# Patient Record
Sex: FEMALE | Race: WHITE | NOT HISPANIC OR LATINO | Employment: UNEMPLOYED | URBAN - METROPOLITAN AREA
[De-identification: names, ages, dates, MRNs, and addresses within clinical notes are randomized per-mention and may not be internally consistent; named-entity substitution may affect disease eponyms.]

---

## 2023-09-15 ENCOUNTER — HOSPITAL ENCOUNTER (OUTPATIENT)
Dept: DATA CONVERSION | Facility: HOSPITAL | Age: 28
Discharge: HOME | End: 2023-09-15
Payer: COMMERCIAL

## 2023-09-15 DIAGNOSIS — O99.612 DISEASES OF THE DIGESTIVE SYSTEM COMPLICATING PREGNANCY, SECOND TRIMESTER (HHS-HCC): ICD-10-CM

## 2023-09-15 LAB
T3 SERPL-MCNC: 218 NG/DL (ref 94–170)
T4 FREE SERPL-MCNC: 0.9 NG/DL (ref 0.9–1.7)
TSH SERPL DL<=0.05 MIU/L-ACNC: 2.84 MIU/L (ref 0.27–4.2)

## 2023-09-17 PROBLEM — D72.9 WHITE BLOOD CELL DISORDER: Status: ACTIVE | Noted: 2023-09-17

## 2023-09-17 PROBLEM — N12 PYELONEPHRITIS: Status: ACTIVE | Noted: 2023-09-17

## 2023-09-17 PROBLEM — S09.90XA INJURY OF HEAD: Status: ACTIVE | Noted: 2023-09-17

## 2023-09-17 PROBLEM — K29.60 REFLUX GASTRITIS: Status: ACTIVE | Noted: 2023-09-17

## 2023-09-17 PROBLEM — R39.89 SENSATION OF PRESSURE IN BLADDER AREA: Status: ACTIVE | Noted: 2023-09-17

## 2023-09-17 PROBLEM — S83.90XA SPRAIN OF KNEE AND LEG: Status: ACTIVE | Noted: 2023-09-17

## 2023-09-17 PROBLEM — N60.12 FIBROCYSTIC DISEASE OF LEFT BREAST: Status: ACTIVE | Noted: 2023-09-17

## 2023-09-17 PROBLEM — M25.519 SHOULDER PAIN: Status: ACTIVE | Noted: 2023-09-17

## 2023-09-17 PROBLEM — R09.81 CONGESTION OF NASAL SINUS: Status: ACTIVE | Noted: 2023-09-17

## 2023-09-17 PROBLEM — N91.0 DELAYED MENSES: Status: ACTIVE | Noted: 2023-09-17

## 2023-09-17 PROBLEM — O36.0190 MATERNAL CARE FOR ANTI-D (RH) ANTIBODIES, UNSPECIFIED TRIMESTER, NOT APPLICABLE OR UNSPECIFIED (HHS-HCC): Status: ACTIVE | Noted: 2023-09-17

## 2023-09-17 PROBLEM — Z97.3 WEARS GLASSES: Status: ACTIVE | Noted: 2023-09-17

## 2023-09-17 PROBLEM — B37.31 YEAST VAGINITIS: Status: ACTIVE | Noted: 2023-09-17

## 2023-09-17 PROBLEM — M26.609 TMJ (TEMPOROMANDIBULAR JOINT DISORDER): Status: ACTIVE | Noted: 2023-09-17

## 2023-09-17 PROBLEM — R55 SYNCOPE: Status: ACTIVE | Noted: 2023-09-17

## 2023-09-17 PROBLEM — F32.9 MAJOR DEPRESSIVE DISORDER, SINGLE EPISODE, UNSPECIFIED: Status: ACTIVE | Noted: 2023-09-17

## 2023-09-17 PROBLEM — S89.90XA INJURY OF KNEE: Status: ACTIVE | Noted: 2023-09-17

## 2023-09-17 PROBLEM — N92.6 IRREGULAR MENSTRUAL CYCLE: Status: ACTIVE | Noted: 2023-09-17

## 2023-09-17 PROBLEM — R10.9 ABDOMINAL PAIN: Status: ACTIVE | Noted: 2023-09-17

## 2023-09-17 PROBLEM — M25.569 KNEE PAIN: Status: ACTIVE | Noted: 2023-09-17

## 2023-09-17 PROBLEM — N94.6 DYSMENORRHEA: Status: ACTIVE | Noted: 2023-09-17

## 2023-09-17 PROBLEM — R11.2 NAUSEA & VOMITING: Status: ACTIVE | Noted: 2023-09-17

## 2023-09-17 PROBLEM — G43.909 MIGRAINE HEADACHE: Status: ACTIVE | Noted: 2023-09-17

## 2023-09-17 PROBLEM — H81.319: Status: ACTIVE | Noted: 2023-09-17

## 2023-09-17 PROBLEM — N83.202 LEFT OVARIAN CYST: Status: ACTIVE | Noted: 2023-09-17

## 2023-09-17 PROBLEM — R09.1 PLEURISY: Status: ACTIVE | Noted: 2023-09-17

## 2023-09-17 PROBLEM — K59.09 CHRONIC CONSTIPATION: Status: ACTIVE | Noted: 2023-09-17

## 2023-09-17 PROBLEM — R42 VERTIGO: Status: ACTIVE | Noted: 2023-09-17

## 2023-09-17 PROBLEM — H52.7 REFRACTIVE ERROR: Status: ACTIVE | Noted: 2023-09-17

## 2023-09-17 PROBLEM — M54.9 BACKACHE: Status: ACTIVE | Noted: 2023-09-17

## 2023-09-17 PROBLEM — R10.9 FLANK PAIN: Status: ACTIVE | Noted: 2023-09-17

## 2023-09-17 PROBLEM — R10.2 FEMALE PELVIC PAIN: Status: ACTIVE | Noted: 2023-09-17

## 2023-09-17 PROBLEM — L20.9 ATOPIC DERMATITIS: Status: ACTIVE | Noted: 2023-09-17

## 2023-09-17 PROBLEM — H57.89 EYE DISCHARGE: Status: ACTIVE | Noted: 2023-09-17

## 2023-09-17 PROBLEM — F07.81 POSTCONCUSSION SYNDROME: Status: ACTIVE | Noted: 2023-09-17

## 2023-09-17 PROBLEM — R42 LIGHTHEADEDNESS: Status: ACTIVE | Noted: 2023-09-17

## 2023-09-17 PROBLEM — R10.9 ABDOMINAL DISCOMFORT: Status: ACTIVE | Noted: 2023-09-17

## 2023-09-17 PROBLEM — N94.0 OVULATION PAIN: Status: ACTIVE | Noted: 2023-09-17

## 2023-09-17 PROBLEM — E86.0 DEHYDRATION: Status: ACTIVE | Noted: 2023-09-17

## 2023-09-17 PROBLEM — K58.9 IRRITABLE BOWEL SYNDROME: Status: ACTIVE | Noted: 2023-09-17

## 2023-09-17 PROBLEM — R51.9 HEADACHE: Status: ACTIVE | Noted: 2023-09-17

## 2023-09-17 PROBLEM — F41.9 ANXIETY: Status: ACTIVE | Noted: 2023-09-17

## 2023-09-17 PROBLEM — R10.30 INGUINAL PAIN: Status: ACTIVE | Noted: 2023-09-17

## 2023-09-17 PROBLEM — K21.9 GASTROESOPHAGEAL REFLUX DISEASE: Status: ACTIVE | Noted: 2023-09-17

## 2023-09-17 PROBLEM — Z97.5 NEXPLANON IN PLACE: Status: ACTIVE | Noted: 2023-09-17

## 2023-09-17 PROBLEM — R30.0 DYSURIA: Status: ACTIVE | Noted: 2023-09-17

## 2023-09-17 PROBLEM — N94.10 DYSPAREUNIA IN FEMALE: Status: ACTIVE | Noted: 2023-09-17

## 2023-09-17 PROBLEM — N64.4 BREAST PAIN, LEFT: Status: ACTIVE | Noted: 2023-09-17

## 2023-09-17 RX ORDER — POLYETHYLENE GLYCOL 3350 17 G/17G
POWDER, FOR SOLUTION ORAL
COMMUNITY
End: 2023-10-16 | Stop reason: ALTCHOICE

## 2023-09-17 RX ORDER — SERTRALINE HYDROCHLORIDE 100 MG/1
100 TABLET, FILM COATED ORAL DAILY
COMMUNITY
Start: 2023-09-13 | End: 2023-10-16 | Stop reason: ALTCHOICE

## 2023-09-17 RX ORDER — LIDOCAINE 50 MG/G
PATCH TOPICAL DAILY
COMMUNITY
Start: 2022-09-27 | End: 2023-10-16 | Stop reason: ALTCHOICE

## 2023-09-17 RX ORDER — SERTRALINE HYDROCHLORIDE 50 MG/1
50 TABLET, FILM COATED ORAL DAILY
COMMUNITY
Start: 2021-03-24 | End: 2023-10-16 | Stop reason: ALTCHOICE

## 2023-09-17 RX ORDER — DIPHENHYDRAMINE HCL 25 MG
25 CAPSULE ORAL 3 TIMES DAILY
COMMUNITY
Start: 2020-09-03 | End: 2023-10-16 | Stop reason: ALTCHOICE

## 2023-09-17 RX ORDER — SERTRALINE HYDROCHLORIDE 100 MG/1
200 TABLET, FILM COATED ORAL DAILY
COMMUNITY
Start: 2023-03-22 | End: 2023-10-16 | Stop reason: ALTCHOICE

## 2023-09-17 RX ORDER — ONDANSETRON 4 MG/1
4 TABLET, FILM COATED ORAL EVERY 8 HOURS PRN
COMMUNITY
End: 2023-10-16 | Stop reason: ALTCHOICE

## 2023-09-17 RX ORDER — ONDANSETRON HYDROCHLORIDE 8 MG/1
8 TABLET, FILM COATED ORAL 3 TIMES DAILY
COMMUNITY
Start: 2013-07-26 | End: 2023-10-16 | Stop reason: ALTCHOICE

## 2023-09-17 RX ORDER — SYRING-NEEDL,DISP,INSUL,0.3 ML 29 G X1/2"
300 SYRINGE, EMPTY DISPOSABLE MISCELLANEOUS
COMMUNITY
Start: 2020-03-07 | End: 2023-10-16 | Stop reason: ALTCHOICE

## 2023-09-17 RX ORDER — ONDANSETRON 4 MG/1
4 TABLET, ORALLY DISINTEGRATING ORAL EVERY 6 HOURS PRN
COMMUNITY
Start: 2023-09-14 | End: 2023-10-16 | Stop reason: ALTCHOICE

## 2023-09-17 RX ORDER — NORGESTIMATE AND ETHINYL ESTRADIOL 0.25-0.035
KIT ORAL
COMMUNITY
Start: 2021-03-24 | End: 2023-10-16 | Stop reason: ALTCHOICE

## 2023-09-17 RX ORDER — MELOXICAM 7.5 MG/1
7.5 TABLET ORAL 2 TIMES DAILY
COMMUNITY
Start: 2022-09-27 | End: 2023-10-03 | Stop reason: ENTERED-IN-ERROR

## 2023-09-18 LAB — THYROPEROXIDASE AB SERPL-ACNC: NORMAL [IU]/ML

## 2023-09-19 LAB — THYROGLOB AB SERPL-ACNC: NORMAL [IU]/ML

## 2023-10-03 ENCOUNTER — TELEPHONE (OUTPATIENT)
Dept: OBSTETRICS AND GYNECOLOGY | Facility: CLINIC | Age: 28
End: 2023-10-03
Payer: COMMERCIAL

## 2023-10-03 DIAGNOSIS — O99.283 HYPERTHYROIDISM AFFECTING PREGNANCY IN THIRD TRIMESTER (HHS-HCC): Primary | ICD-10-CM

## 2023-10-03 DIAGNOSIS — E05.90 HYPERTHYROIDISM AFFECTING PREGNANCY IN THIRD TRIMESTER (HHS-HCC): Primary | ICD-10-CM

## 2023-10-03 NOTE — TELEPHONE ENCOUNTER
Patient notified that labs were ordered. She will get done and advised we would call her with results once we get them back.

## 2023-10-05 ENCOUNTER — LAB (OUTPATIENT)
Dept: LAB | Facility: LAB | Age: 28
End: 2023-10-05
Payer: COMMERCIAL

## 2023-10-05 DIAGNOSIS — O99.283 HYPERTHYROIDISM AFFECTING PREGNANCY IN THIRD TRIMESTER (HHS-HCC): ICD-10-CM

## 2023-10-05 DIAGNOSIS — E05.90 HYPERTHYROIDISM AFFECTING PREGNANCY IN THIRD TRIMESTER (HHS-HCC): ICD-10-CM

## 2023-10-05 LAB
T4 FREE SERPL-MCNC: 0.52 NG/DL (ref 0.61–1.12)
TSH SERPL-ACNC: 1.99 MIU/L (ref 0.44–3.98)

## 2023-10-05 PROCEDURE — 84480 ASSAY TRIIODOTHYRONINE (T3): CPT

## 2023-10-05 PROCEDURE — 36415 COLL VENOUS BLD VENIPUNCTURE: CPT

## 2023-10-06 LAB — T3 SERPL-MCNC: 242 NG/DL (ref 60–200)

## 2023-10-10 DIAGNOSIS — Z34.82 ENCOUNTER FOR SUPERVISION OF OTHER NORMAL PREGNANCY IN SECOND TRIMESTER (HHS-HCC): Primary | ICD-10-CM

## 2023-10-12 ENCOUNTER — OFFICE VISIT (OUTPATIENT)
Dept: PRIMARY CARE | Facility: CLINIC | Age: 28
End: 2023-10-12
Payer: COMMERCIAL

## 2023-10-12 VITALS
SYSTOLIC BLOOD PRESSURE: 124 MMHG | WEIGHT: 214 LBS | OXYGEN SATURATION: 99 % | HEIGHT: 55 IN | DIASTOLIC BLOOD PRESSURE: 77 MMHG | TEMPERATURE: 98.7 F | BODY MASS INDEX: 49.53 KG/M2 | HEART RATE: 94 BPM | RESPIRATION RATE: 18 BRPM

## 2023-10-12 DIAGNOSIS — F41.9 ANXIETY: ICD-10-CM

## 2023-10-12 DIAGNOSIS — R79.89 ABNORMAL THYROID BLOOD TEST: Primary | ICD-10-CM

## 2023-10-12 PROCEDURE — 99203 OFFICE O/P NEW LOW 30 MIN: CPT | Performed by: FAMILY MEDICINE

## 2023-10-12 RX ORDER — SERTRALINE HYDROCHLORIDE 100 MG/1
150 TABLET, FILM COATED ORAL DAILY
Qty: 45 TABLET | Refills: 1 | Status: SHIPPED | OUTPATIENT
Start: 2023-10-12 | End: 2024-01-16

## 2023-10-12 RX ORDER — PRENATAL VIT 49/IRON FUM/FOLIC 6.75-0.2MG
TABLET ORAL
COMMUNITY
End: 2024-03-19 | Stop reason: WASHOUT

## 2023-10-12 ASSESSMENT — ENCOUNTER SYMPTOMS
LOSS OF SENSATION IN FEET: 0
OCCASIONAL FEELINGS OF UNSTEADINESS: 0
DEPRESSION: 0

## 2023-10-12 ASSESSMENT — PATIENT HEALTH QUESTIONNAIRE - PHQ9
2. FEELING DOWN, DEPRESSED OR HOPELESS: NOT AT ALL
SUM OF ALL RESPONSES TO PHQ9 QUESTIONS 1 AND 2: 0
1. LITTLE INTEREST OR PLEASURE IN DOING THINGS: NOT AT ALL

## 2023-10-12 ASSESSMENT — PAIN SCALES - GENERAL: PAINLEVEL: 0-NO PAIN

## 2023-10-12 NOTE — PROGRESS NOTES
"Subjective   Patient ID: Veena Barone is a 27 y.o. female who presents for No chief complaint on file..    HPI     Review of Systems   All other systems reviewed and are negative.      Objective   /77 (BP Location: Left arm, Patient Position: Sitting, BP Cuff Size: Adult)   Pulse 94   Temp 37.1 °C (98.7 °F) (Temporal)   Resp 18   Ht 0.787 m (2' 7\")   Wt 97.1 kg (214 lb)   LMP 05/15/2023   SpO2 99%   .56 kg/m²     Physical Exam    Assessment/Plan          "

## 2023-10-16 ENCOUNTER — ROUTINE PRENATAL (OUTPATIENT)
Dept: OBSTETRICS AND GYNECOLOGY | Facility: CLINIC | Age: 28
End: 2023-10-16
Payer: COMMERCIAL

## 2023-10-16 VITALS
DIASTOLIC BLOOD PRESSURE: 72 MMHG | WEIGHT: 215.4 LBS | SYSTOLIC BLOOD PRESSURE: 118 MMHG | BODY MASS INDEX: 157.59 KG/M2

## 2023-10-16 DIAGNOSIS — Z3A.21 21 WEEKS GESTATION OF PREGNANCY (HHS-HCC): ICD-10-CM

## 2023-10-16 DIAGNOSIS — Z34.82 ENCOUNTER FOR SUPERVISION OF OTHER NORMAL PREGNANCY IN SECOND TRIMESTER (HHS-HCC): Primary | ICD-10-CM

## 2023-10-16 LAB
POC APPEARANCE, URINE: CLEAR
POC BILIRUBIN, URINE: NEGATIVE
POC BLOOD, URINE: ABNORMAL
POC COLOR, URINE: YELLOW
POC GLUCOSE, URINE: NEGATIVE MG/DL
POC KETONES, URINE: NEGATIVE MG/DL
POC LEUKOCYTES, URINE: ABNORMAL
POC NITRITE,URINE: NEGATIVE
POC PH, URINE: 6.5 PH
POC PROTEIN, URINE: NEGATIVE MG/DL
POC SPECIFIC GRAVITY, URINE: 1.02
POC UROBILINOGEN, URINE: 0.2 EU/DL

## 2023-10-16 PROCEDURE — 0501F PRENATAL FLOW SHEET: CPT | Performed by: OBSTETRICS & GYNECOLOGY

## 2023-10-16 PROCEDURE — 99213 OFFICE O/P EST LOW 20 MIN: CPT | Performed by: OBSTETRICS & GYNECOLOGY

## 2023-10-16 PROCEDURE — 81003 URINALYSIS AUTO W/O SCOPE: CPT | Mod: QW | Performed by: OBSTETRICS & GYNECOLOGY

## 2023-10-16 ASSESSMENT — PATIENT HEALTH QUESTIONNAIRE - PHQ9
SUM OF ALL RESPONSES TO PHQ9 QUESTIONS 1 & 2: 0
1. LITTLE INTEREST OR PLEASURE IN DOING THINGS: NOT AT ALL
2. FEELING DOWN, DEPRESSED OR HOPELESS: NOT AT ALL

## 2023-10-16 NOTE — PROGRESS NOTES
Subjective   Patient ID [id number]   Veena Barone is a 27 y.o.  at 21w4d with a working estimated date of delivery of 2024, by Ultrasound who presents for a routine prenatal visit. She denies vaginal bleeding, leakage of fluid, decreased fetal movements, or contractions.    Her pregnancy is complicated by:  Rh neg  Depression, on meds  Abnormal thyroid labs    Objective   Physical Exam  Weight: 97.7 kg (215 lb 6.4 oz)  Expected Total Weight Gain: 5 kg (11 lb)-9 kg (19 lb)   Pregravid BMI: 160.39  BP: 118/72         Prenatal Labs  Urine dip:  Lab Results   Component Value Date    KETONESU NEGATIVE 2023         Imaging  Normal anatomy scan with poor heart views, will sched for 30 wks    Assessment/Plan   Diagnoses and all orders for this visit:  Encounter for supervision of other normal pregnancy in second trimester  -     POCT UA Automated manually resulted  21 weeks gestation of pregnancy      Continue prenatal vitamin.  Poor heart views on ultrasound, will sched follow up at 30 wks  Endocrine referral.  GTT next visit  Orders placed for follow up ultrasound and GTT  Follow up in 4 weeks for a routine prenatal visit.    MD Alexandria Mann MD

## 2023-10-17 ENCOUNTER — TELEPHONE (OUTPATIENT)
Dept: OBSTETRICS AND GYNECOLOGY | Facility: CLINIC | Age: 28
End: 2023-10-17
Payer: COMMERCIAL

## 2023-10-17 NOTE — TELEPHONE ENCOUNTER
Patient called stating that she woke up today with yeast infection. C/o swelling, itching and white discharge. Advised I would call in Diflucan 150mg take one tab PO as directed #1 with zero refills. Patient notified Rx called in.

## 2023-10-27 ENCOUNTER — OFFICE VISIT (OUTPATIENT)
Dept: ENDOCRINOLOGY | Facility: CLINIC | Age: 28
End: 2023-10-27
Payer: COMMERCIAL

## 2023-10-27 VITALS
DIASTOLIC BLOOD PRESSURE: 72 MMHG | BODY MASS INDEX: 158.03 KG/M2 | HEART RATE: 97 BPM | WEIGHT: 216 LBS | SYSTOLIC BLOOD PRESSURE: 133 MMHG

## 2023-10-27 DIAGNOSIS — Z3A.23 23 WEEKS GESTATION OF PREGNANCY (HHS-HCC): ICD-10-CM

## 2023-10-27 DIAGNOSIS — R94.6 ABNORMAL THYROID FUNCTION TEST: Primary | ICD-10-CM

## 2023-10-27 PROCEDURE — 99204 OFFICE O/P NEW MOD 45 MIN: CPT | Performed by: INTERNAL MEDICINE

## 2023-10-27 PROCEDURE — 1036F TOBACCO NON-USER: CPT | Performed by: INTERNAL MEDICINE

## 2023-10-27 NOTE — PROGRESS NOTES
History Of Present Illness  Veena Barone is a 27 y.o. female with abnormal thyroid function tests  G2, P1  Weeks' gestation 23  QIAN: 2/22/24    Weight loss instead of gain, starting to gain  Alternating hot and cold intolerance  Infrequent palpitations    Fetal growth normal per patient.    No history of thyroid dysfunction  No history of goiter  No history of neck surgery or radiation    Past Medical History  She has a past medical history of Depression, Disorder of the skin and subcutaneous tissue, unspecified (05/14/2014), Encounter for contraceptive management, unspecified (04/17/2015), Encounter for immunization (02/13/2014), GERD (gastroesophageal reflux disease), ovarian cyst, Left upper quadrant abdominal tenderness (06/25/2013), Migraine, Obesity in pregnancy, Other chest pain, Other urogenital candidiasis (09/15/2014), Personal history of diseases of the skin and subcutaneous tissue (06/01/2015), Personal history of other diseases of the digestive system (05/14/2014), Personal history of other diseases of the female genital tract (03/26/2014), Personal history of other diseases of the musculoskeletal system and connective tissue (03/24/2015), Personal history of other diseases of the nervous system and sense organs (08/30/2015), Personal history of other diseases of the respiratory system (09/04/2013), Personal history of other specified conditions (11/18/2015), and Personal history of other specified conditions (01/14/2014).    Surgical History  She has no past surgical history on file.     Social History  She reports that she has never smoked. She has never used smokeless tobacco. She reports that she does not drink alcohol and does not use drugs.    Family History  Family History   Problem Relation Name Age of Onset    Hypertension Mother      Kidney nephrosis Mother      No Known Problems Father      No Known Problems Sister      Diabetes Mother's Sister      Liver cancer Maternal Grandmother       Thyroid cancer Maternal Grandmother      Heart attack Maternal Grandfather      Diabetes Maternal Grandfather      Lung cancer Paternal Grandmother      Heart attack Paternal Grandfather          cause of death    Heart disease Paternal Grandfather      No Known Problems Daughter      Endometriosis Other         Allergies  Patient has no known allergies.    Review of Systems   Constitutional:  Negative for unexpected weight change.   Eyes:  Negative for visual disturbance.   Respiratory:  Negative for shortness of breath.    Cardiovascular:  Positive for palpitations. Negative for chest pain.   Gastrointestinal:  Negative for constipation, diarrhea, nausea and vomiting.   Endocrine:        As above   Musculoskeletal:  Negative for arthralgias.   Skin:  Negative for rash.         Last Recorded Vitals  Blood pressure 133/72, pulse 97, weight 98 kg (216 lb), last menstrual period 05/15/2023.    Physical Exam  Constitutional:       General: She is not in acute distress.  HENT:      Head: Normocephalic.   Eyes:      Extraocular Movements: Extraocular movements intact.   Neck:      Thyroid: No thyromegaly.   Cardiovascular:      Pulses:           Radial pulses are 2+ on the right side and 2+ on the left side.   Lymphadenopathy:      Cervical: No cervical adenopathy.   Neurological:      Mental Status: She is alert.      Motor: No tremor.   Psychiatric:         Mood and Affect: Affect normal.          Relevant Results  Lab Results   Component Value Date    TSH 1.99 10/05/2023    FREET4 0.52 (L) 10/05/2023    THYROIDPAB  09/15/2023     <28  Unit: IU/mL    Negative: <=60 U/mL  Positive: >60 U/mL  Test performed at:  [facility] [address] [facility] [address]        Latest Reference Range & Units 09/15/23 13:52   Thyroxine, Free 0.9 - 1.7 NG/DL 0.9   Triiodothyronine 94 - 170 NG/ (H)   Thyroid Stimulating Hormone 0.27 - 4.20 MIU/L 2.84   (H): Data is abnormally high    IMPRESSION  ABNORMAL THYROID FUNCTION  TEST  PREGNANCY  Elevation of T3 of undetermined significance  Normal TSH suggests she is euthyroid  Discussed possible interference with thyroid assays by Biotin supplementation  No goiter      RECOMMENDATIONS  HOLD prenatal vitamin for 2 days then redraw thyroid labs: TSH, T4, T3  Results on [employer]  Call/message if you have not received results in 3 days.    Follow up to be determined.

## 2023-10-27 NOTE — PATIENT INSTRUCTIONS
RECOMMENDATIONS  HOLD prenatal vitamin for 2 days then redraw thyroid labs: TSH, T4, T3  Results on [employer]  Call/message if you have not received results in 3 days.    Follow up to be determined.

## 2023-10-27 NOTE — LETTER
October 29, 2023     Juan Cesar DO  [address]  [address]  [facility] [address]    Patient: Veena Barone   YOB: 1995   Date of Visit: 10/27/2023       Dear Dr. Juan Cesar DO:    Thank you for referring Veena Barone to me for evaluation. Below are my notes for this consultation.  If you have questions, please do not hesitate to call me. I look forward to following your patient along with you.       Sincerely,     Jarod Parker MD      CC: Alexandria Rao MD  ______________________________________________________________________________________    History Of Present Illness  Veena Barone is a 27 y.o. female with abnormal thyroid function tests  G2, P1  Weeks' gestation 23  QIAN: 2/22/24    Weight loss instead of gain, starting to gain  Alternating hot and cold intolerance  Infrequent palpitations    Fetal growth normal per patient.    No history of thyroid dysfunction  No history of goiter  No history of neck surgery or radiation    Past Medical History  She has a past medical history of Depression, Disorder of the skin and subcutaneous tissue, unspecified (05/14/2014), Encounter for contraceptive management, unspecified (04/17/2015), Encounter for immunization (02/13/2014), GERD (gastroesophageal reflux disease), ovarian cyst, Left upper quadrant abdominal tenderness (06/25/2013), Migraine, Obesity in pregnancy, Other chest pain, Other urogenital candidiasis (09/15/2014), Personal history of diseases of the skin and subcutaneous tissue (06/01/2015), Personal history of other diseases of the digestive system (05/14/2014), Personal history of other diseases of the female genital tract (03/26/2014), Personal history of other diseases of the musculoskeletal system and connective tissue (03/24/2015), Personal history of other diseases of the nervous system and sense organs (08/30/2015), Personal history of other diseases of the respiratory system (09/04/2013),  Personal history of other specified conditions (11/18/2015), and Personal history of other specified conditions (01/14/2014).    Surgical History  She has no past surgical history on file.     Social History  She reports that she has never smoked. She has never used smokeless tobacco. She reports that she does not drink alcohol and does not use drugs.    Family History  Family History   Problem Relation Name Age of Onset   • Hypertension Mother     • Kidney nephrosis Mother     • No Known Problems Father     • No Known Problems Sister     • Diabetes Mother's Sister     • Liver cancer Maternal Grandmother     • Thyroid cancer Maternal Grandmother     • Heart attack Maternal Grandfather     • Diabetes Maternal Grandfather     • Lung cancer Paternal Grandmother     • Heart attack Paternal Grandfather          cause of death   • Heart disease Paternal Grandfather     • No Known Problems Daughter     • Endometriosis Other         Allergies  Patient has no known allergies.    Review of Systems   Constitutional:  Negative for unexpected weight change.   Eyes:  Negative for visual disturbance.   Respiratory:  Negative for shortness of breath.    Cardiovascular:  Positive for palpitations. Negative for chest pain.   Gastrointestinal:  Negative for constipation, diarrhea, nausea and vomiting.   Endocrine:        As above   Musculoskeletal:  Negative for arthralgias.   Skin:  Negative for rash.         Last Recorded Vitals  Blood pressure 133/72, pulse 97, weight 98 kg (216 lb), last menstrual period 05/15/2023.    Physical Exam  Constitutional:       General: She is not in acute distress.  HENT:      Head: Normocephalic.   Eyes:      Extraocular Movements: Extraocular movements intact.   Neck:      Thyroid: No thyromegaly.   Cardiovascular:      Pulses:           Radial pulses are 2+ on the right side and 2+ on the left side.   Lymphadenopathy:      Cervical: No cervical adenopathy.   Neurological:      Mental Status: She is  alert.      Motor: No tremor.   Psychiatric:         Mood and Affect: Affect normal.          Relevant Results  Lab Results   Component Value Date    TSH 1.99 10/05/2023    FREET4 0.52 (L) 10/05/2023    THYROIDPAB  09/15/2023     <28  Unit: IU/mL    Negative: <=60 U/mL  Positive: >60 U/mL  Test performed at:  [facility] [address] [facility] [address]        Latest Reference Range & Units 09/15/23 13:52   Thyroxine, Free 0.9 - 1.7 NG/DL 0.9   Triiodothyronine 94 - 170 NG/ (H)   Thyroid Stimulating Hormone 0.27 - 4.20 MIU/L 2.84   (H): Data is abnormally high    IMPRESSION  ABNORMAL THYROID FUNCTION TEST  PREGNANCY  Elevation of T3 of undetermined significance  Normal TSH suggests she is euthyroid  Discussed possible interference with thyroid assays by Biotin supplementation  No goiter      RECOMMENDATIONS  HOLD prenatal vitamin for 2 days then redraw thyroid labs: TSH, T4, T3  Results on [employer]  Call/message if you have not received results in 3 days.    Follow up to be determined.

## 2023-10-29 ASSESSMENT — ENCOUNTER SYMPTOMS
VOMITING: 0
ARTHRALGIAS: 0
ENDOCRINE COMMENTS: AS ABOVE
NAUSEA: 0
DIARRHEA: 0
PALPITATIONS: 1
UNEXPECTED WEIGHT CHANGE: 0
CONSTIPATION: 0
SHORTNESS OF BREATH: 0

## 2023-11-03 ENCOUNTER — LAB (OUTPATIENT)
Dept: LAB | Facility: LAB | Age: 28
End: 2023-11-03
Payer: COMMERCIAL

## 2023-11-03 DIAGNOSIS — R94.6 ABNORMAL THYROID FUNCTION TEST: ICD-10-CM

## 2023-11-03 LAB
T3 SERPL-MCNC: 247 NG/DL (ref 60–200)
T4 SERPL-MCNC: 10.8 UG/DL (ref 4.5–11.1)
TSH SERPL-ACNC: 2.15 MIU/L (ref 0.44–3.98)

## 2023-11-03 PROCEDURE — 36415 COLL VENOUS BLD VENIPUNCTURE: CPT

## 2023-11-03 PROCEDURE — 84436 ASSAY OF TOTAL THYROXINE: CPT

## 2023-11-03 PROCEDURE — 84480 ASSAY TRIIODOTHYRONINE (T3): CPT

## 2023-11-16 ENCOUNTER — ROUTINE PRENATAL (OUTPATIENT)
Dept: OBSTETRICS AND GYNECOLOGY | Facility: CLINIC | Age: 28
End: 2023-11-16
Payer: COMMERCIAL

## 2023-11-16 ENCOUNTER — LAB (OUTPATIENT)
Dept: LAB | Facility: LAB | Age: 28
End: 2023-11-16
Payer: COMMERCIAL

## 2023-11-16 VITALS — SYSTOLIC BLOOD PRESSURE: 122 MMHG | WEIGHT: 221 LBS | DIASTOLIC BLOOD PRESSURE: 70 MMHG | BODY MASS INDEX: 161.69 KG/M2

## 2023-11-16 DIAGNOSIS — Z3A.25 25 WEEKS GESTATION OF PREGNANCY (HHS-HCC): ICD-10-CM

## 2023-11-16 DIAGNOSIS — Z34.82 ENCOUNTER FOR SUPERVISION OF OTHER NORMAL PREGNANCY IN SECOND TRIMESTER (HHS-HCC): ICD-10-CM

## 2023-11-16 DIAGNOSIS — Z34.82 ENCOUNTER FOR SUPERVISION OF OTHER NORMAL PREGNANCY IN SECOND TRIMESTER (HHS-HCC): Primary | ICD-10-CM

## 2023-11-16 LAB
ABO GROUP (TYPE) IN BLOOD: NORMAL
BASOPHILS # BLD AUTO: 0.03 X10*3/UL (ref 0–0.1)
BASOPHILS NFR BLD AUTO: 0.3 %
EOSINOPHIL # BLD AUTO: 0.19 X10*3/UL (ref 0–0.7)
EOSINOPHIL NFR BLD AUTO: 1.9 %
ERYTHROCYTE [DISTWIDTH] IN BLOOD BY AUTOMATED COUNT: 13.7 % (ref 11.5–14.5)
GLUCOSE 1H P GLC SERPL-MCNC: 117 MG/DL (ref 65–139)
HCT VFR BLD AUTO: 35.2 % (ref 36–46)
HGB BLD-MCNC: 11.8 G/DL (ref 12–16)
IMM GRANULOCYTES # BLD AUTO: 0.04 X10*3/UL (ref 0–0.7)
IMM GRANULOCYTES NFR BLD AUTO: 0.4 % (ref 0–0.9)
LYMPHOCYTES # BLD AUTO: 2.08 X10*3/UL (ref 1.2–4.8)
LYMPHOCYTES NFR BLD AUTO: 20.7 %
MCH RBC QN AUTO: 30.9 PG (ref 26–34)
MCHC RBC AUTO-ENTMCNC: 33.5 G/DL (ref 32–36)
MCV RBC AUTO: 92 FL (ref 80–100)
MONOCYTES # BLD AUTO: 0.67 X10*3/UL (ref 0.1–1)
MONOCYTES NFR BLD AUTO: 6.7 %
NEUTROPHILS # BLD AUTO: 7.06 X10*3/UL (ref 1.2–7.7)
NEUTROPHILS NFR BLD AUTO: 70 %
NRBC BLD-RTO: 0 /100 WBCS (ref 0–0)
PLATELET # BLD AUTO: 347 X10*3/UL (ref 150–450)
POC APPEARANCE, URINE: CLEAR
POC BILIRUBIN, URINE: NEGATIVE
POC BLOOD, URINE: NEGATIVE
POC COLOR, URINE: YELLOW
POC GLUCOSE, URINE: NEGATIVE MG/DL
POC KETONES, URINE: ABNORMAL MG/DL
POC LEUKOCYTES, URINE: ABNORMAL
POC NITRITE,URINE: NEGATIVE
POC PH, URINE: 7 PH
POC PROTEIN, URINE: ABNORMAL MG/DL
POC SPECIFIC GRAVITY, URINE: 1.02
POC UROBILINOGEN, URINE: 0.2 EU/DL
RBC # BLD AUTO: 3.82 X10*6/UL (ref 4–5.2)
RH FACTOR (ANTIGEN D): NORMAL
WBC # BLD AUTO: 10.1 X10*3/UL (ref 4.4–11.3)

## 2023-11-16 PROCEDURE — 81003 URINALYSIS AUTO W/O SCOPE: CPT | Performed by: OBSTETRICS & GYNECOLOGY

## 2023-11-16 PROCEDURE — 86901 BLOOD TYPING SEROLOGIC RH(D): CPT

## 2023-11-16 PROCEDURE — 85025 COMPLETE CBC W/AUTO DIFF WBC: CPT

## 2023-11-16 PROCEDURE — 0501F PRENATAL FLOW SHEET: CPT | Performed by: OBSTETRICS & GYNECOLOGY

## 2023-11-16 PROCEDURE — 86900 BLOOD TYPING SEROLOGIC ABO: CPT

## 2023-11-16 PROCEDURE — 36415 COLL VENOUS BLD VENIPUNCTURE: CPT

## 2023-11-16 PROCEDURE — 82947 ASSAY GLUCOSE BLOOD QUANT: CPT

## 2023-11-16 ASSESSMENT — LIFESTYLE VARIABLES
HOW MANY STANDARD DRINKS CONTAINING ALCOHOL DO YOU HAVE ON A TYPICAL DAY: PATIENT DOES NOT DRINK
HOW OFTEN DO YOU HAVE A DRINK CONTAINING ALCOHOL: NEVER

## 2023-11-16 NOTE — PROGRESS NOTES
Subjective   Patient ID [id number]   Veena Barone is a 27 y.o.  at 25w6d with a working estimated date of delivery of 2024, by Ultrasound who presents for a routine prenatal visit. She denies vaginal bleeding, leakage of fluid, decreased fetal movements, or contractions.      Objective   Physical Exam     Pregravid BMI: 160.39                       Prenatal Labs  Urine dip:  Results for orders placed or performed in visit on 23   T4   Result Value Ref Range    Thyroxine 10.8 4.5 - 11.1 ug/dL   T3   Result Value Ref Range    Triiodothyronine 247 (H) 60 - 200 ng/dL   TSH   Result Value Ref Range    Thyroid Stimulating Hormone 2.15 0.44 - 3.98 mIU/L         Imaging  Scheduled at 33 weeks    Assessment/Plan   Diagnoses and all orders for this visit:  Encounter for supervision of other normal pregnancy in second trimester  25 weeks gestation of pregnancy  -     POCT UA Automated manually resulted    Continue prenatal vitamin.  Labs reviewed.  Rhogam -RECEIVED AT 11 WEEKS-WILL CHECK T&S  GTT .    Follow up in 2 weeks for a routine prenatal visit.

## 2023-11-30 ENCOUNTER — TELEPHONE (OUTPATIENT)
Dept: OBSTETRICS AND GYNECOLOGY | Facility: CLINIC | Age: 28
End: 2023-11-30
Payer: COMMERCIAL

## 2023-11-30 NOTE — TELEPHONE ENCOUNTER
28 weeks pregnant.  Pt has 1 hour done 11/16 and she is Rh negative.  Pt next appt she will be 30 weeks, does she need to have rhogam sooner or can she get done at her next appt

## 2023-12-04 ENCOUNTER — OFFICE VISIT (OUTPATIENT)
Dept: OBSTETRICS AND GYNECOLOGY | Facility: CLINIC | Age: 28
End: 2023-12-04
Payer: COMMERCIAL

## 2023-12-04 VITALS
SYSTOLIC BLOOD PRESSURE: 134 MMHG | DIASTOLIC BLOOD PRESSURE: 72 MMHG | BODY MASS INDEX: 162.56 KG/M2 | WEIGHT: 222.2 LBS

## 2023-12-04 DIAGNOSIS — Z3A.28 28 WEEKS GESTATION OF PREGNANCY (HHS-HCC): ICD-10-CM

## 2023-12-04 DIAGNOSIS — Z34.82 ENCOUNTER FOR SUPERVISION OF OTHER NORMAL PREGNANCY IN SECOND TRIMESTER (HHS-HCC): Primary | ICD-10-CM

## 2023-12-04 PROCEDURE — 99212 OFFICE O/P EST SF 10 MIN: CPT | Performed by: OBSTETRICS & GYNECOLOGY

## 2023-12-04 PROCEDURE — 1036F TOBACCO NON-USER: CPT | Performed by: OBSTETRICS & GYNECOLOGY

## 2023-12-04 PROCEDURE — 2500000004 HC RX 250 GENERAL PHARMACY W/ HCPCS (ALT 636 FOR OP/ED): Performed by: OBSTETRICS & GYNECOLOGY

## 2023-12-04 RX ADMIN — HUMAN RHO(D) IMMUNE GLOBULIN 300 MCG: 300 INJECTION, SOLUTION INTRAMUSCULAR at 15:00

## 2023-12-08 NOTE — PROGRESS NOTES
Subjective   Patient ID [id number]   Veena Barone is a 28 y.o.  at 29w1d with a working estimated date of delivery of 2024, by Ultrasound who presents for a routine prenatal visit. She denies vaginal bleeding, leakage of fluid, decreased fetal movements, or contractions.    Objective   Physical Exam:   Weight: 101 kg (223 lb)    Pregravid BMI: 160.39      BP: 130/72                  Prenatal Labs  Urine Dip  Results for orders placed or performed in visit on 23   POCT UA Automated manually resulted   Result Value Ref Range    POC Color, Urine Yellow Straw, Yellow, Light-Yellow    POC Appearance, Urine Clear Clear    POC Glucose, Urine NEGATIVE NEGATIVE mg/dl    POC Bilirubin, Urine NEGATIVE NEGATIVE    POC Ketones, Urine 40 (2+) (A) NEGATIVE mg/dl    POC Specific Gravity, Urine >=1.030 1.005 - 1.035    POC Blood, Urine NEGATIVE NEGATIVE    POC PH, Urine 7.0 No Reference Range Established PH    POC Protein, Urine TRACE (A) NEGATIVE, 30 (1+) mg/dl    POC Urobilinogen, Urine 0.2 0.2, 1.0 EU/DL    Poc Nitrite, Urine NEGATIVE NEGATIVE    POC Leukocytes, Urine SMALL (1+) (A) NEGATIVE         Assessment/Plan   Diagnoses and all orders for this visit:  Encounter for supervision of other normal pregnancy in second trimester  29 weeks gestation of pregnancy  -     POCT UA Automated manually resulted    Continue prenatal vitamin.    Expected mode of delivery   Follow up in 1 week for a routine prenatal visit.

## 2023-12-13 ENCOUNTER — ROUTINE PRENATAL (OUTPATIENT)
Dept: OBSTETRICS AND GYNECOLOGY | Facility: CLINIC | Age: 28
End: 2023-12-13
Payer: COMMERCIAL

## 2023-12-13 ENCOUNTER — APPOINTMENT (OUTPATIENT)
Dept: OBSTETRICS AND GYNECOLOGY | Facility: CLINIC | Age: 28
End: 2023-12-13
Payer: COMMERCIAL

## 2023-12-13 VITALS — DIASTOLIC BLOOD PRESSURE: 72 MMHG | WEIGHT: 223 LBS | SYSTOLIC BLOOD PRESSURE: 130 MMHG | BODY MASS INDEX: 163.15 KG/M2

## 2023-12-13 DIAGNOSIS — Z3A.29 29 WEEKS GESTATION OF PREGNANCY (HHS-HCC): ICD-10-CM

## 2023-12-13 DIAGNOSIS — Z34.82 ENCOUNTER FOR SUPERVISION OF OTHER NORMAL PREGNANCY IN SECOND TRIMESTER (HHS-HCC): Primary | ICD-10-CM

## 2023-12-13 LAB
POC APPEARANCE, URINE: CLEAR
POC BILIRUBIN, URINE: NEGATIVE
POC BLOOD, URINE: NEGATIVE
POC COLOR, URINE: YELLOW
POC GLUCOSE, URINE: NEGATIVE MG/DL
POC KETONES, URINE: ABNORMAL MG/DL
POC LEUKOCYTES, URINE: ABNORMAL
POC NITRITE,URINE: NEGATIVE
POC PH, URINE: 7 PH
POC PROTEIN, URINE: ABNORMAL MG/DL
POC SPECIFIC GRAVITY, URINE: >=1.03
POC UROBILINOGEN, URINE: 0.2 EU/DL

## 2023-12-13 PROCEDURE — 81003 URINALYSIS AUTO W/O SCOPE: CPT | Performed by: OBSTETRICS & GYNECOLOGY

## 2023-12-13 PROCEDURE — 0501F PRENATAL FLOW SHEET: CPT | Performed by: OBSTETRICS & GYNECOLOGY

## 2023-12-13 ASSESSMENT — LIFESTYLE VARIABLES
HOW MANY STANDARD DRINKS CONTAINING ALCOHOL DO YOU HAVE ON A TYPICAL DAY: PATIENT DOES NOT DRINK
HOW OFTEN DO YOU HAVE SIX OR MORE DRINKS ON ONE OCCASION: NEVER
HOW OFTEN DO YOU HAVE A DRINK CONTAINING ALCOHOL: NEVER
SKIP TO QUESTIONS 9-10: 1
AUDIT-C TOTAL SCORE: 0

## 2023-12-13 ASSESSMENT — PATIENT HEALTH QUESTIONNAIRE - PHQ9
2. FEELING DOWN, DEPRESSED OR HOPELESS: NOT AT ALL
SUM OF ALL RESPONSES TO PHQ9 QUESTIONS 1 & 2: 0
1. LITTLE INTEREST OR PLEASURE IN DOING THINGS: NOT AT ALL

## 2023-12-13 ASSESSMENT — ENCOUNTER SYMPTOMS
DEPRESSION: 0
OCCASIONAL FEELINGS OF UNSTEADINESS: 0
LOSS OF SENSATION IN FEET: 0

## 2023-12-13 ASSESSMENT — PAIN SCALES - GENERAL: PAINLEVEL_OUTOF10: 0 - NO PAIN

## 2023-12-13 ASSESSMENT — PAIN - FUNCTIONAL ASSESSMENT: PAIN_FUNCTIONAL_ASSESSMENT: 0-10

## 2023-12-27 ENCOUNTER — APPOINTMENT (OUTPATIENT)
Dept: OBSTETRICS AND GYNECOLOGY | Facility: CLINIC | Age: 28
End: 2023-12-27
Payer: COMMERCIAL

## 2024-01-02 NOTE — PROGRESS NOTES
Subjective   Patient ID [id number]   Veena Barone is a 28 y.o.  at 32w5d with a working estimated date of delivery of 2024, by Ultrasound who presents for a routine prenatal visit. She denies vaginal bleeding, leakage of fluid, decreased fetal movements, or contractions.    Her pregnancy is complicated by:  Abnormal thyroid labs, seeing endocrine  Poor heart views -   Last pregnancy slight shoulder dystocia 7#,9oz  Wants PP BPS    Objective   Physical Exam:   Weight: 101 kg (223 lb)    Pregravid BMI: 160.39      BP: 128/74  Fetal Heart Rate: 145 Fundal Height (cm): 33 cm Presentation: Vertex           Prenatal Labs  Urine Dip  Results for orders placed or performed in visit on 24   POCT UA Automated manually resulted   Result Value Ref Range    POC Color, Urine Yellow Straw, Yellow, Light-Yellow    POC Appearance, Urine Clear Clear    POC Glucose, Urine NEGATIVE NEGATIVE mg/dl    POC Bilirubin, Urine NEGATIVE NEGATIVE    POC Ketones, Urine NEGATIVE NEGATIVE mg/dl    POC Specific Gravity, Urine >=1.030 1.005 - 1.035    POC Blood, Urine TRACE-Intact (A) NEGATIVE    POC PH, Urine 7.0 No Reference Range Established PH    POC Protein, Urine TRACE (A) NEGATIVE, 30 (1+) mg/dl    POC Urobilinogen, Urine 0.2 0.2, 1.0 EU/DL    Poc Nitrite, Urine NEGATIVE NEGATIVE    POC Leukocytes, Urine MODERATE (2+) (A) NEGATIVE               Assessment/Plan   Diagnoses and all orders for this visit:  Prenatal care, subsequent pregnancy, third trimester  32 weeks gestation of pregnancy  -     POCT UA Automated manually resulted    Continue prenatal vitamin.    Follow up in 1 week for a routine prenatal visit.

## 2024-01-03 ENCOUNTER — ROUTINE PRENATAL (OUTPATIENT)
Dept: OBSTETRICS AND GYNECOLOGY | Facility: CLINIC | Age: 29
End: 2024-01-03
Payer: COMMERCIAL

## 2024-01-03 VITALS — WEIGHT: 223 LBS | DIASTOLIC BLOOD PRESSURE: 74 MMHG | SYSTOLIC BLOOD PRESSURE: 128 MMHG | BODY MASS INDEX: 163.15 KG/M2

## 2024-01-03 DIAGNOSIS — Z34.83 PRENATAL CARE, SUBSEQUENT PREGNANCY, THIRD TRIMESTER (HHS-HCC): Primary | ICD-10-CM

## 2024-01-03 DIAGNOSIS — Z3A.32 32 WEEKS GESTATION OF PREGNANCY (HHS-HCC): ICD-10-CM

## 2024-01-03 LAB
POC APPEARANCE, URINE: CLEAR
POC BILIRUBIN, URINE: NEGATIVE
POC BLOOD, URINE: ABNORMAL
POC COLOR, URINE: YELLOW
POC GLUCOSE, URINE: NEGATIVE MG/DL
POC KETONES, URINE: NEGATIVE MG/DL
POC LEUKOCYTES, URINE: ABNORMAL
POC NITRITE,URINE: NEGATIVE
POC PH, URINE: 7 PH
POC PROTEIN, URINE: ABNORMAL MG/DL
POC SPECIFIC GRAVITY, URINE: >=1.03
POC UROBILINOGEN, URINE: 0.2 EU/DL

## 2024-01-03 PROCEDURE — 0501F PRENATAL FLOW SHEET: CPT | Performed by: OBSTETRICS & GYNECOLOGY

## 2024-01-03 ASSESSMENT — PAIN SCALES - GENERAL: PAINLEVEL_OUTOF10: 0 - NO PAIN

## 2024-01-03 ASSESSMENT — PATIENT HEALTH QUESTIONNAIRE - PHQ9
1. LITTLE INTEREST OR PLEASURE IN DOING THINGS: NOT AT ALL
2. FEELING DOWN, DEPRESSED OR HOPELESS: NOT AT ALL
SUM OF ALL RESPONSES TO PHQ9 QUESTIONS 1 & 2: 0

## 2024-01-03 ASSESSMENT — PAIN - FUNCTIONAL ASSESSMENT: PAIN_FUNCTIONAL_ASSESSMENT: 0-10

## 2024-01-09 ENCOUNTER — APPOINTMENT (OUTPATIENT)
Dept: OBSTETRICS AND GYNECOLOGY | Facility: CLINIC | Age: 29
End: 2024-01-09
Payer: COMMERCIAL

## 2024-01-10 ENCOUNTER — APPOINTMENT (OUTPATIENT)
Dept: OBSTETRICS AND GYNECOLOGY | Facility: CLINIC | Age: 29
End: 2024-01-10
Payer: COMMERCIAL

## 2024-01-14 DIAGNOSIS — F41.9 ANXIETY: ICD-10-CM

## 2024-01-16 RX ORDER — SERTRALINE HYDROCHLORIDE 100 MG/1
150 TABLET, FILM COATED ORAL DAILY
Qty: 135 TABLET | Refills: 3 | Status: SHIPPED | OUTPATIENT
Start: 2024-01-16 | End: 2025-01-15

## 2024-01-18 ENCOUNTER — ROUTINE PRENATAL (OUTPATIENT)
Dept: OBSTETRICS AND GYNECOLOGY | Facility: CLINIC | Age: 29
End: 2024-01-18
Payer: COMMERCIAL

## 2024-01-18 ENCOUNTER — LAB (OUTPATIENT)
Dept: LAB | Facility: LAB | Age: 29
End: 2024-01-18
Payer: COMMERCIAL

## 2024-01-18 VITALS — BODY MASS INDEX: 163.88 KG/M2 | DIASTOLIC BLOOD PRESSURE: 76 MMHG | WEIGHT: 224 LBS | SYSTOLIC BLOOD PRESSURE: 136 MMHG

## 2024-01-18 DIAGNOSIS — Z34.83 PRENATAL CARE, SUBSEQUENT PREGNANCY, THIRD TRIMESTER (HHS-HCC): Primary | ICD-10-CM

## 2024-01-18 DIAGNOSIS — O12.13 PROTEINURIA AFFECTING PREGNANCY IN THIRD TRIMESTER (HHS-HCC): ICD-10-CM

## 2024-01-18 DIAGNOSIS — Z3A.34 34 WEEKS GESTATION OF PREGNANCY (HHS-HCC): ICD-10-CM

## 2024-01-18 LAB
ALT SERPL-CCNC: <5 U/L (ref 5–40)
AST SERPL-CCNC: 12 U/L (ref 5–40)
CREAT UR-MCNC: 193.1 MG/DL
ERYTHROCYTE [DISTWIDTH] IN BLOOD BY AUTOMATED COUNT: 12.1 % (ref 11.5–14.5)
HCT VFR BLD AUTO: 34.8 % (ref 36–46)
HGB BLD-MCNC: 11.7 G/DL (ref 12–16)
LDH SERPL-CCNC: 173 U/L (ref 91–227)
MCH RBC QN AUTO: 29.2 PG (ref 26–34)
MCHC RBC AUTO-ENTMCNC: 33.6 G/DL (ref 32–36)
MCV RBC AUTO: 87 FL (ref 80–100)
NRBC BLD-RTO: 0 /100 WBCS (ref 0–0)
PLATELET # BLD AUTO: 326 X10*3/UL (ref 150–450)
POC APPEARANCE, URINE: CLEAR
POC BILIRUBIN, URINE: NEGATIVE
POC BLOOD, URINE: NEGATIVE
POC COLOR, URINE: YELLOW
POC GLUCOSE, URINE: NEGATIVE MG/DL
POC KETONES, URINE: NEGATIVE MG/DL
POC LEUKOCYTES, URINE: ABNORMAL
POC NITRITE,URINE: NEGATIVE
POC PH, URINE: 7 PH
POC PROTEIN, URINE: ABNORMAL MG/DL
POC SPECIFIC GRAVITY, URINE: 1.02
POC UROBILINOGEN, URINE: 0.2 EU/DL
PROT UR-MCNC: 47 MG/DL
PROT/CREAT UR: 0.24 MG/MG CREAT
RBC # BLD AUTO: 4.01 X10*6/UL (ref 4–5.2)
URATE SERPL-MCNC: 5.2 MG/DL (ref 2.5–6.8)
WBC # BLD AUTO: 9.6 X10*3/UL (ref 4.4–11.3)

## 2024-01-18 PROCEDURE — 85027 COMPLETE CBC AUTOMATED: CPT

## 2024-01-18 PROCEDURE — 87081 CULTURE SCREEN ONLY: CPT | Mod: WESLAB | Performed by: OBSTETRICS & GYNECOLOGY

## 2024-01-18 PROCEDURE — 84450 TRANSFERASE (AST) (SGOT): CPT

## 2024-01-18 PROCEDURE — 84156 ASSAY OF PROTEIN URINE: CPT

## 2024-01-18 PROCEDURE — 36415 COLL VENOUS BLD VENIPUNCTURE: CPT

## 2024-01-18 PROCEDURE — 84550 ASSAY OF BLOOD/URIC ACID: CPT

## 2024-01-18 PROCEDURE — 84460 ALANINE AMINO (ALT) (SGPT): CPT

## 2024-01-18 PROCEDURE — 83615 LACTATE (LD) (LDH) ENZYME: CPT

## 2024-01-18 PROCEDURE — 82570 ASSAY OF URINE CREATININE: CPT

## 2024-01-18 PROCEDURE — 0501F PRENATAL FLOW SHEET: CPT | Performed by: OBSTETRICS & GYNECOLOGY

## 2024-01-18 PROCEDURE — 81003 URINALYSIS AUTO W/O SCOPE: CPT | Performed by: OBSTETRICS & GYNECOLOGY

## 2024-01-18 ASSESSMENT — LIFESTYLE VARIABLES
HOW OFTEN DO YOU HAVE A DRINK CONTAINING ALCOHOL: NEVER
HOW MANY STANDARD DRINKS CONTAINING ALCOHOL DO YOU HAVE ON A TYPICAL DAY: PATIENT DOES NOT DRINK

## 2024-01-18 ASSESSMENT — ENCOUNTER SYMPTOMS
LOSS OF SENSATION IN FEET: 0
DEPRESSION: 0
OCCASIONAL FEELINGS OF UNSTEADINESS: 0

## 2024-01-18 ASSESSMENT — PATIENT HEALTH QUESTIONNAIRE - PHQ9
1. LITTLE INTEREST OR PLEASURE IN DOING THINGS: NOT AT ALL
SUM OF ALL RESPONSES TO PHQ9 QUESTIONS 1 & 2: 0
2. FEELING DOWN, DEPRESSED OR HOPELESS: NOT AT ALL

## 2024-01-18 ASSESSMENT — PAIN - FUNCTIONAL ASSESSMENT: PAIN_FUNCTIONAL_ASSESSMENT: 0-10

## 2024-01-18 ASSESSMENT — PAIN SCALES - GENERAL: PAINLEVEL_OUTOF10: 0 - NO PAIN

## 2024-01-18 NOTE — PROGRESS NOTES
Subjective   Patient ID [id number]   Veena Barone is a 28 y.o.  at 34w6d with a working estimated date of delivery of 2024, by Ultrasound who presents for a routine prenatal visit. She denies vaginal bleeding, leakage of fluid, decreased fetal movements, or contractions.    Her pregnancy is complicated by:  Abnormal thyroid labs, seeing endocrine  Poor heart views -   Last pregnancy slight shoulder dystocia 7#,9oz  Wants PP BPS  -COVID    Objective   Physical Exam:   Weight: 102 kg (224 lb)    Pregravid BMI: 160.39      BP: 136/76  Fetal Heart Rate: 145 Fundal Height (cm): 37 cm Presentation: Vertex  Dilation: 2 Effacement (%): 50 Fetal Station: -2    Prenatal Labs  Urine Dip  Results for orders placed or performed in visit on 24   POCT UA Automated manually resulted   Result Value Ref Range    POC Color, Urine Yellow Straw, Yellow, Light-Yellow    POC Appearance, Urine Clear Clear    POC Glucose, Urine NEGATIVE NEGATIVE mg/dl    POC Bilirubin, Urine NEGATIVE NEGATIVE    POC Ketones, Urine NEGATIVE NEGATIVE mg/dl    POC Specific Gravity, Urine 1.025 1.005 - 1.035    POC Blood, Urine NEGATIVE NEGATIVE    POC PH, Urine 7.0 No Reference Range Established PH    POC Protein, Urine 100 (2+) (A) NEGATIVE, 30 (1+) mg/dl    POC Urobilinogen, Urine 0.2 0.2, 1.0 EU/DL    Poc Nitrite, Urine NEGATIVE NEGATIVE    POC Leukocytes, Urine LARGE (3+) (A) NEGATIVE         Assessment/Plan   Diagnoses and all orders for this visit:  Prenatal care, subsequent pregnancy, third trimester  34 weeks gestation of pregnancy  -     POCT UA Automated manually resulted  -     Group B Streptococcus (GBS) Prenatal Screen, Culture  Proteinuria affecting pregnancy in third trimester  -     Aspartate Aminotransferase; Future  -     Alanine Aminotransferase; Future  -     CBC; Future  -     Uric Acid; Future  -     Protein, Urine Random; Future  -     Creatinine, Urine Random; Future  -     Lactate Dehydrogenase;  Future    Continue prenatal vitamin.  Labs reviewed.  GBS taken.  Expected mode of delivery   Follow up in 1 week for a routine prenatal visit.

## 2024-01-19 ENCOUNTER — TELEPHONE (OUTPATIENT)
Dept: OBSTETRICS AND GYNECOLOGY | Facility: CLINIC | Age: 29
End: 2024-01-19
Payer: COMMERCIAL

## 2024-01-21 LAB — GP B STREP GENITAL QL CULT: NORMAL

## 2024-01-24 ENCOUNTER — ROUTINE PRENATAL (OUTPATIENT)
Dept: OBSTETRICS AND GYNECOLOGY | Facility: CLINIC | Age: 29
End: 2024-01-24
Payer: COMMERCIAL

## 2024-01-24 ENCOUNTER — APPOINTMENT (OUTPATIENT)
Dept: OBSTETRICS AND GYNECOLOGY | Facility: CLINIC | Age: 29
End: 2024-01-24
Payer: COMMERCIAL

## 2024-01-24 VITALS
WEIGHT: 225.6 LBS | DIASTOLIC BLOOD PRESSURE: 80 MMHG | BODY MASS INDEX: 165.05 KG/M2 | SYSTOLIC BLOOD PRESSURE: 136 MMHG

## 2024-01-24 DIAGNOSIS — Z34.83 PRENATAL CARE, SUBSEQUENT PREGNANCY, THIRD TRIMESTER (HHS-HCC): Primary | ICD-10-CM

## 2024-01-24 DIAGNOSIS — Z3A.35 35 WEEKS GESTATION OF PREGNANCY (HHS-HCC): ICD-10-CM

## 2024-01-24 LAB
POC APPEARANCE, URINE: CLEAR
POC BILIRUBIN, URINE: NEGATIVE
POC BLOOD, URINE: NEGATIVE
POC COLOR, URINE: YELLOW
POC GLUCOSE, URINE: NEGATIVE MG/DL
POC KETONES, URINE: NEGATIVE MG/DL
POC LEUKOCYTES, URINE: ABNORMAL
POC NITRITE,URINE: NEGATIVE
POC PH, URINE: 7 PH
POC PROTEIN, URINE: ABNORMAL MG/DL
POC SPECIFIC GRAVITY, URINE: >=1.03
POC UROBILINOGEN, URINE: 0.2 EU/DL

## 2024-01-24 PROCEDURE — 0501F PRENATAL FLOW SHEET: CPT | Performed by: OBSTETRICS & GYNECOLOGY

## 2024-01-24 PROCEDURE — 90715 TDAP VACCINE 7 YRS/> IM: CPT | Performed by: OBSTETRICS & GYNECOLOGY

## 2024-01-24 ASSESSMENT — PAIN - FUNCTIONAL ASSESSMENT: PAIN_FUNCTIONAL_ASSESSMENT: 0-10

## 2024-01-24 ASSESSMENT — LIFESTYLE VARIABLES
HOW MANY STANDARD DRINKS CONTAINING ALCOHOL DO YOU HAVE ON A TYPICAL DAY: PATIENT DOES NOT DRINK
SKIP TO QUESTIONS 9-10: 1
HOW OFTEN DO YOU HAVE A DRINK CONTAINING ALCOHOL: NEVER
HOW OFTEN DO YOU HAVE SIX OR MORE DRINKS ON ONE OCCASION: NEVER
AUDIT-C TOTAL SCORE: 0

## 2024-01-24 ASSESSMENT — ENCOUNTER SYMPTOMS
LOSS OF SENSATION IN FEET: 0
OCCASIONAL FEELINGS OF UNSTEADINESS: 0
DEPRESSION: 0

## 2024-01-24 ASSESSMENT — PAIN SCALES - GENERAL: PAINLEVEL_OUTOF10: 0 - NO PAIN

## 2024-01-24 NOTE — PROGRESS NOTES
Subjective   Patient ID [id number]   Veena Barone is a 28 y.o.  at 35w6d with a working estimated date of delivery of 2024, by Ultrasound who presents for a routine prenatal visit. She denies vaginal bleeding, leakage of fluid, decreased fetal movements, or contractions.    Her pregnancy is complicated by:  Abnormal thyroid labs, seeing endocrine  Poor heart views -   Last pregnancy slight shoulder dystocia 7#,9oz  Wants PP BPS  -COVID  -TDAP    Objective   Physical Exam:   Weight: 102 kg (225 lb 9.6 oz)    Pregravid BMI: 160.39      BP: 136/80  Fetal Heart Rate: positive Fundal Height (cm): 36 cm Presentation: Vertex  Dilation: 3 Effacement (%): 60 Fetal Station: -2    Prenatal Labs  Urine Dip  Results for orders placed or performed in visit on 24   POCT UA Automated manually resulted   Result Value Ref Range    POC Color, Urine Yellow Straw, Yellow, Light-Yellow    POC Appearance, Urine Clear Clear    POC Glucose, Urine NEGATIVE NEGATIVE mg/dl    POC Bilirubin, Urine NEGATIVE NEGATIVE    POC Ketones, Urine NEGATIVE NEGATIVE mg/dl    POC Specific Gravity, Urine >=1.030 1.005 - 1.035    POC Blood, Urine NEGATIVE NEGATIVE    POC PH, Urine 7.0 No Reference Range Established PH    POC Protein, Urine 30 (1+) NEGATIVE, 30 (1+) mg/dl    POC Urobilinogen, Urine 0.2 0.2, 1.0 EU/DL    Poc Nitrite, Urine NEGATIVE NEGATIVE    POC Leukocytes, Urine SMALL (1+) (A) NEGATIVE           Assessment/Plan   Diagnoses and all orders for this visit:  Prenatal care, subsequent pregnancy, third trimester  35 weeks gestation of pregnancy  -     POCT UA Automated manually resulted  Other orders  -     Tdap vaccine, age 7 years and older    Continue prenatal vitamin.  Labs reviewed.  Labor precautions given.  Follow up in 1 week for a routine prenatal visit.

## 2024-01-30 NOTE — PROGRESS NOTES
Subjective   Patient ID [id number]   Veena Barone is a 28 y.o.  at 36w5d with a working estimated date of delivery of 2024, by Ultrasound who presents for a routine prenatal visit. She denies vaginal bleeding, leakage of fluid, decreased fetal movements, or contractions.        Objective   Physical Exam:   Weight: 104 kg (228 lb 3.2 oz)    Pregravid BMI: 160.39      BP: 132/78  Fetal Heart Rate: positive Fundal Height (cm): 38 cm Presentation: Vertex  Dilation: 3 Effacement (%): 70 Fetal Station: -2    Prenatal Labs  Urine Dip  Results for orders placed or performed in visit on 24   POCT UA Automated manually resulted   Result Value Ref Range    POC Color, Urine Yellow Straw, Yellow, Light-Yellow    POC Appearance, Urine Clear Clear    POC Glucose, Urine NEGATIVE NEGATIVE mg/dl    POC Bilirubin, Urine NEGATIVE NEGATIVE    POC Ketones, Urine NEGATIVE NEGATIVE mg/dl    POC Specific Gravity, Urine >=1.030 1.005 - 1.035    POC Blood, Urine NEGATIVE NEGATIVE    POC PH, Urine 7.0 No Reference Range Established PH    POC Protein, Urine 30 (1+) NEGATIVE, 30 (1+) mg/dl    POC Urobilinogen, Urine 0.2 0.2, 1.0 EU/DL    Poc Nitrite, Urine NEGATIVE NEGATIVE    POC Leukocytes, Urine SMALL (1+) (A) NEGATIVE                 Assessment/Plan   Diagnoses and all orders for this visit:  Prenatal care, subsequent pregnancy, third trimester  37 weeks gestation of pregnancy  -     POCT UA Automated manually resulted    Continue prenatal vitamin.  Labs reviewed.  Labor precautions given.  Movement counts  Follow up in 1 week for a routine prenatal visit.

## 2024-02-01 ENCOUNTER — ROUTINE PRENATAL (OUTPATIENT)
Dept: OBSTETRICS AND GYNECOLOGY | Facility: CLINIC | Age: 29
End: 2024-02-01
Payer: COMMERCIAL

## 2024-02-01 VITALS
SYSTOLIC BLOOD PRESSURE: 132 MMHG | BODY MASS INDEX: 166.95 KG/M2 | WEIGHT: 228.2 LBS | DIASTOLIC BLOOD PRESSURE: 78 MMHG

## 2024-02-01 DIAGNOSIS — Z34.83 PRENATAL CARE, SUBSEQUENT PREGNANCY, THIRD TRIMESTER (HHS-HCC): Primary | ICD-10-CM

## 2024-02-01 DIAGNOSIS — Z3A.37 37 WEEKS GESTATION OF PREGNANCY (HHS-HCC): ICD-10-CM

## 2024-02-01 PROCEDURE — 81003 URINALYSIS AUTO W/O SCOPE: CPT | Performed by: OBSTETRICS & GYNECOLOGY

## 2024-02-01 PROCEDURE — 0501F PRENATAL FLOW SHEET: CPT | Performed by: OBSTETRICS & GYNECOLOGY

## 2024-02-01 ASSESSMENT — PAIN - FUNCTIONAL ASSESSMENT: PAIN_FUNCTIONAL_ASSESSMENT: 0-10

## 2024-02-01 ASSESSMENT — LIFESTYLE VARIABLES
HOW OFTEN DO YOU HAVE SIX OR MORE DRINKS ON ONE OCCASION: NEVER
HOW MANY STANDARD DRINKS CONTAINING ALCOHOL DO YOU HAVE ON A TYPICAL DAY: PATIENT DOES NOT DRINK
AUDIT-C TOTAL SCORE: 0
HOW OFTEN DO YOU HAVE A DRINK CONTAINING ALCOHOL: NEVER
SKIP TO QUESTIONS 9-10: 1

## 2024-02-01 ASSESSMENT — ENCOUNTER SYMPTOMS
LOSS OF SENSATION IN FEET: 0
DEPRESSION: 0
OCCASIONAL FEELINGS OF UNSTEADINESS: 0

## 2024-02-01 ASSESSMENT — PAIN SCALES - GENERAL: PAINLEVEL_OUTOF10: 0 - NO PAIN

## 2024-02-03 ENCOUNTER — HOSPITAL ENCOUNTER (OUTPATIENT)
Facility: HOSPITAL | Age: 29
Discharge: HOME | End: 2024-02-03
Attending: OBSTETRICS & GYNECOLOGY | Admitting: OBSTETRICS & GYNECOLOGY
Payer: COMMERCIAL

## 2024-02-03 VITALS
HEIGHT: 61 IN | HEART RATE: 90 BPM | TEMPERATURE: 98.2 F | OXYGEN SATURATION: 98 % | SYSTOLIC BLOOD PRESSURE: 128 MMHG | RESPIRATION RATE: 16 BRPM | BODY MASS INDEX: 43.29 KG/M2 | WEIGHT: 229.28 LBS | DIASTOLIC BLOOD PRESSURE: 80 MMHG

## 2024-02-03 DIAGNOSIS — N12 PYELONEPHRITIS: Primary | ICD-10-CM

## 2024-02-03 LAB
BILIRUBIN, POC: NORMAL
BLOOD URINE, POC: POSITIVE
CLARITY, POC: CLEAR
COLOR, POC: ABNORMAL
GLUCOSE URINE, POC: NEGATIVE
KETONES, POC: POSITIVE
LEUKOCYTE EST, POC: ABNORMAL
NITRITE, POC: NEGATIVE
PH, POC: 6
SPECIFIC GRAVITY, POC: 1.02
URINE PROTEIN, POC: 30
UROBILINOGEN, POC: NORMAL

## 2024-02-03 PROCEDURE — 87086 URINE CULTURE/COLONY COUNT: CPT | Mod: TRILAB,WESLAB | Performed by: STUDENT IN AN ORGANIZED HEALTH CARE EDUCATION/TRAINING PROGRAM

## 2024-02-03 PROCEDURE — 81002 URINALYSIS NONAUTO W/O SCOPE: CPT | Performed by: STUDENT IN AN ORGANIZED HEALTH CARE EDUCATION/TRAINING PROGRAM

## 2024-02-03 RX ORDER — NITROFURANTOIN 25; 75 MG/1; MG/1
100 CAPSULE ORAL 2 TIMES DAILY
Qty: 14 CAPSULE | Refills: 0 | Status: SHIPPED | OUTPATIENT
Start: 2024-02-03 | End: 2024-02-13 | Stop reason: ALTCHOICE

## 2024-02-03 ASSESSMENT — PAIN SCALES - GENERAL: PAINLEVEL: 4

## 2024-02-03 NOTE — H&P
Obstetrical Admission History and Physical     Veena Barone is a 28 y.o. .     Chief Complaint: Contractions    Assessment/Plan     at at 37 wk here with constant back pain, dysuria. Likely UTI, and cervix unchanged from last office visit. Nst reactive, and only occ ctx on monitoring  -OK for discharge home  -start macrobid for likely UTI on UA  -urine for culture  -Labor precautions reviewed    Active Problems:  There are no active Hospital Problems.      Pregnancy Problems (from 25 to present)       No problems associated with this episode.          Subjective   Veena is here complaining of  at at 37 wk here with constant back pain, dysuria. Unsure if she is having contractions, denies LOF, vag bleeding, Dec fm       Obstetrical History   OB History    Para Term  AB Living   3 1 1 0 1 1   SAB IAB Ectopic Multiple Live Births   1 0 0 0 1      # Outcome Date GA Lbr Jared/2nd Weight Sex Delivery Anes PTL Lv   3 Current            2 SAB 2023     Complete      1 Term 19   3.43 kg F Vag-Spont   DEJA       Past Medical History  Past Medical History:   Diagnosis Date    Depression     Disorder of the skin and subcutaneous tissue, unspecified 2014    Skin lesion    Encounter for contraceptive management, unspecified 2015    Contraception management    Encounter for immunization 2014    Need for HPV vaccine    GERD (gastroesophageal reflux disease)     Hx of ovarian cyst     Left upper quadrant abdominal tenderness 2013    LUQ abdominal tenderness    Migraine     Obesity in pregnancy     Other chest pain     Pain, chest wall    Other urogenital candidiasis 09/15/2014    Urogenital candidiasis    Personal history of diseases of the skin and subcutaneous tissue 2015    History of urticaria    Personal history of other diseases of the digestive system 2014    History of constipation    Personal history of other diseases of the female  "genital tract 03/26/2014    History of vaginitis    Personal history of other diseases of the musculoskeletal system and connective tissue 03/24/2015    History of backache    Personal history of other diseases of the nervous system and sense organs 08/30/2015    History of acute otitis media    Personal history of other diseases of the respiratory system 09/04/2013    Personal history of acute sinusitis    Personal history of other specified conditions 11/18/2015    History of abdominal pain    Personal history of other specified conditions 01/14/2014    History of urinary frequency        Past Surgical History   History reviewed. No pertinent surgical history.    Social History  Social History     Tobacco Use    Smoking status: Never    Smokeless tobacco: Never   Substance Use Topics    Alcohol use: Never     Substance and Sexual Activity   Drug Use Never       Allergies  Patient has no known allergies.     Medications  Medications Prior to Admission   Medication Sig Dispense Refill Last Dose    prenatal vit 49-iron fum-folic (Mini Prenatal) 6.75 mg iron- 200 mcg tablet as directed Orally       sertraline (Zoloft) 100 mg tablet Take 1.5 tablets (150 mg) by mouth once daily. 135 tablet 3        Objective    Last Vitals  Temp Pulse Resp BP MAP O2 Sat   36.8 °C (98.2 °F) 90 16 128/80   98 %     Physical Examination  GENERAL: Examination reveals a well developed, well nourished, gravid female in no acute distress. She is alert and cooperative.  LUNGS: clear to auscultation bilaterally  HEART: regular rate and rhythm, S1, S2 normal, no murmur, click, rub or gallop  ABDOMEN: soft, gravid, nontender, nondistended, no abnormal masses, no epigastric pain  FHR is  ,40 with Accelerations, and a Category I tracing.    [place] reading:    CERVIX: 3 cm dilated, 60 % effaced, -2 station; MEMBRANES are      Lab Review  No results found for: \"WBC\", \"HGB\", \"HCT\", \"PLT\"    "

## 2024-02-04 ENCOUNTER — ANESTHESIA (OUTPATIENT)
Dept: OBSTETRICS AND GYNECOLOGY | Facility: HOSPITAL | Age: 29
End: 2024-02-04
Payer: COMMERCIAL

## 2024-02-04 ENCOUNTER — HOSPITAL ENCOUNTER (INPATIENT)
Facility: HOSPITAL | Age: 29
LOS: 3 days | Discharge: HOME | End: 2024-02-07
Attending: OBSTETRICS & GYNECOLOGY | Admitting: OBSTETRICS & GYNECOLOGY
Payer: COMMERCIAL

## 2024-02-04 ENCOUNTER — ANESTHESIA EVENT (OUTPATIENT)
Dept: OBSTETRICS AND GYNECOLOGY | Facility: HOSPITAL | Age: 29
End: 2024-02-04
Payer: COMMERCIAL

## 2024-02-04 DIAGNOSIS — J10.1 INFLUENZA B: Primary | ICD-10-CM

## 2024-02-04 PROBLEM — Z37.9 NORMAL LABOR (HHS-HCC): Status: ACTIVE | Noted: 2024-02-04

## 2024-02-04 LAB
ABO GROUP (TYPE) IN BLOOD: NORMAL
ANTIBODY SCREEN: NORMAL
BACTERIA UR CULT: NORMAL
ERYTHROCYTE [DISTWIDTH] IN BLOOD BY AUTOMATED COUNT: 12.4 % (ref 11.5–14.5)
FLUAV RNA RESP QL NAA+PROBE: NOT DETECTED
FLUBV RNA RESP QL NAA+PROBE: DETECTED
HCT VFR BLD AUTO: 31.2 % (ref 36–46)
HGB BLD-MCNC: 10.7 G/DL (ref 12–16)
MCH RBC QN AUTO: 29.5 PG (ref 26–34)
MCHC RBC AUTO-ENTMCNC: 34.3 G/DL (ref 32–36)
MCV RBC AUTO: 86 FL (ref 80–100)
NRBC BLD-RTO: 0 /100 WBCS (ref 0–0)
PLATELET # BLD AUTO: 257 X10*3/UL (ref 150–450)
RBC # BLD AUTO: 3.63 X10*6/UL (ref 4–5.2)
RH FACTOR (ANTIGEN D): NORMAL
SARS-COV-2 RNA RESP QL NAA+PROBE: NOT DETECTED
WBC # BLD AUTO: 8.7 X10*3/UL (ref 4.4–11.3)

## 2024-02-04 PROCEDURE — 36415 COLL VENOUS BLD VENIPUNCTURE: CPT | Performed by: OBSTETRICS & GYNECOLOGY

## 2024-02-04 PROCEDURE — 2500000001 HC RX 250 WO HCPCS SELF ADMINISTERED DRUGS (ALT 637 FOR MEDICARE OP): Performed by: OBSTETRICS & GYNECOLOGY

## 2024-02-04 PROCEDURE — 87636 SARSCOV2 & INF A&B AMP PRB: CPT | Performed by: OBSTETRICS & GYNECOLOGY

## 2024-02-04 PROCEDURE — 01967 NEURAXL LBR ANES VAG DLVR: CPT | Performed by: NURSE ANESTHETIST, CERTIFIED REGISTERED

## 2024-02-04 PROCEDURE — 85027 COMPLETE CBC AUTOMATED: CPT | Performed by: OBSTETRICS & GYNECOLOGY

## 2024-02-04 PROCEDURE — 1220000001 HC OB SEMI-PRIVATE ROOM DAILY

## 2024-02-04 PROCEDURE — 2500000005 HC RX 250 GENERAL PHARMACY W/O HCPCS: Performed by: OBSTETRICS & GYNECOLOGY

## 2024-02-04 PROCEDURE — 2500000004 HC RX 250 GENERAL PHARMACY W/ HCPCS (ALT 636 FOR OP/ED): Performed by: OBSTETRICS & GYNECOLOGY

## 2024-02-04 PROCEDURE — 2500000005 HC RX 250 GENERAL PHARMACY W/O HCPCS: Performed by: NURSE ANESTHETIST, CERTIFIED REGISTERED

## 2024-02-04 PROCEDURE — 86780 TREPONEMA PALLIDUM: CPT | Mod: TRILAB,WESLAB | Performed by: OBSTETRICS & GYNECOLOGY

## 2024-02-04 PROCEDURE — 2500000004 HC RX 250 GENERAL PHARMACY W/ HCPCS (ALT 636 FOR OP/ED): Performed by: NURSE ANESTHETIST, CERTIFIED REGISTERED

## 2024-02-04 PROCEDURE — 7210000002 HC LABOR PER HOUR

## 2024-02-04 PROCEDURE — 86901 BLOOD TYPING SEROLOGIC RH(D): CPT | Performed by: OBSTETRICS & GYNECOLOGY

## 2024-02-04 RX ORDER — ONDANSETRON 4 MG/1
4 TABLET, FILM COATED ORAL EVERY 6 HOURS PRN
Status: DISCONTINUED | OUTPATIENT
Start: 2024-02-04 | End: 2024-02-05

## 2024-02-04 RX ORDER — LIDOCAINE HYDROCHLORIDE AND EPINEPHRINE 15; 5 MG/ML; UG/ML
INJECTION, SOLUTION EPIDURAL AS NEEDED
Status: DISCONTINUED | OUTPATIENT
Start: 2024-02-04 | End: 2024-02-05

## 2024-02-04 RX ORDER — MISOPROSTOL 200 UG/1
800 TABLET ORAL ONCE AS NEEDED
Status: DISCONTINUED | OUTPATIENT
Start: 2024-02-04 | End: 2024-02-05

## 2024-02-04 RX ORDER — LIDOCAINE HYDROCHLORIDE 10 MG/ML
INJECTION, SOLUTION EPIDURAL; INFILTRATION; INTRACAUDAL; PERINEURAL AS NEEDED
Status: DISCONTINUED | OUTPATIENT
Start: 2024-02-04 | End: 2024-02-05

## 2024-02-04 RX ORDER — OXYTOCIN 10 [USP'U]/ML
10 INJECTION, SOLUTION INTRAMUSCULAR; INTRAVENOUS ONCE AS NEEDED
Status: DISCONTINUED | OUTPATIENT
Start: 2024-02-04 | End: 2024-02-05

## 2024-02-04 RX ORDER — LABETALOL HYDROCHLORIDE 5 MG/ML
20 INJECTION, SOLUTION INTRAVENOUS ONCE AS NEEDED
Status: DISCONTINUED | OUTPATIENT
Start: 2024-02-04 | End: 2024-02-05

## 2024-02-04 RX ORDER — CARBOPROST TROMETHAMINE 250 UG/ML
250 INJECTION, SOLUTION INTRAMUSCULAR ONCE AS NEEDED
Status: DISCONTINUED | OUTPATIENT
Start: 2024-02-04 | End: 2024-02-05

## 2024-02-04 RX ORDER — ONDANSETRON HYDROCHLORIDE 2 MG/ML
4 INJECTION, SOLUTION INTRAVENOUS EVERY 6 HOURS PRN
Status: DISCONTINUED | OUTPATIENT
Start: 2024-02-04 | End: 2024-02-05

## 2024-02-04 RX ORDER — TERBUTALINE SULFATE 1 MG/ML
0.25 INJECTION SUBCUTANEOUS ONCE AS NEEDED
Status: DISCONTINUED | OUTPATIENT
Start: 2024-02-04 | End: 2024-02-05

## 2024-02-04 RX ORDER — TRANEXAMIC ACID 100 MG/ML
1000 INJECTION, SOLUTION INTRAVENOUS ONCE AS NEEDED
Status: DISCONTINUED | OUTPATIENT
Start: 2024-02-04 | End: 2024-02-05

## 2024-02-04 RX ORDER — LOPERAMIDE HYDROCHLORIDE 2 MG/1
4 CAPSULE ORAL EVERY 2 HOUR PRN
Status: DISCONTINUED | OUTPATIENT
Start: 2024-02-04 | End: 2024-02-05

## 2024-02-04 RX ORDER — OXYTOCIN/0.9 % SODIUM CHLORIDE 30/500 ML
60 PLASTIC BAG, INJECTION (ML) INTRAVENOUS ONCE AS NEEDED
Status: DISCONTINUED | OUTPATIENT
Start: 2024-02-04 | End: 2024-02-05

## 2024-02-04 RX ORDER — METHYLERGONOVINE MALEATE 0.2 MG/ML
0.2 INJECTION INTRAVENOUS ONCE AS NEEDED
Status: DISCONTINUED | OUTPATIENT
Start: 2024-02-04 | End: 2024-02-05

## 2024-02-04 RX ORDER — OXYTOCIN/0.9 % SODIUM CHLORIDE 30/500 ML
2-30 PLASTIC BAG, INJECTION (ML) INTRAVENOUS CONTINUOUS
Status: DISCONTINUED | OUTPATIENT
Start: 2024-02-04 | End: 2024-02-05

## 2024-02-04 RX ORDER — ACETAMINOPHEN 325 MG/1
975 TABLET ORAL EVERY 6 HOURS PRN
Status: DISCONTINUED | OUTPATIENT
Start: 2024-02-04 | End: 2024-02-05

## 2024-02-04 RX ORDER — FENTANYL/ROPIVACAINE/NS/PF 2MCG/ML-.2
0-25 PLASTIC BAG, INJECTION (ML) INJECTION CONTINUOUS
Status: DISCONTINUED | OUTPATIENT
Start: 2024-02-04 | End: 2024-02-05

## 2024-02-04 RX ORDER — METOCLOPRAMIDE HYDROCHLORIDE 5 MG/ML
10 INJECTION INTRAMUSCULAR; INTRAVENOUS EVERY 6 HOURS PRN
Status: DISCONTINUED | OUTPATIENT
Start: 2024-02-04 | End: 2024-02-05

## 2024-02-04 RX ORDER — GUAIFENESIN 100 MG/5ML
200 SOLUTION ORAL EVERY 4 HOURS PRN
Status: DISCONTINUED | OUTPATIENT
Start: 2024-02-04 | End: 2024-02-05

## 2024-02-04 RX ORDER — NIFEDIPINE 10 MG/1
10 CAPSULE ORAL ONCE AS NEEDED
Status: DISCONTINUED | OUTPATIENT
Start: 2024-02-04 | End: 2024-02-05

## 2024-02-04 RX ORDER — OSELTAMIVIR PHOSPHATE 75 MG/1
75 CAPSULE ORAL 2 TIMES DAILY
Status: DISCONTINUED | OUTPATIENT
Start: 2024-02-04 | End: 2024-02-05

## 2024-02-04 RX ORDER — LIDOCAINE HYDROCHLORIDE 10 MG/ML
30 INJECTION INFILTRATION; PERINEURAL ONCE AS NEEDED
Status: COMPLETED | OUTPATIENT
Start: 2024-02-04 | End: 2024-02-04

## 2024-02-04 RX ORDER — NITROFURANTOIN 25; 75 MG/1; MG/1
100 CAPSULE ORAL EVERY 12 HOURS SCHEDULED
Status: DISCONTINUED | OUTPATIENT
Start: 2024-02-04 | End: 2024-02-05

## 2024-02-04 RX ORDER — SODIUM CHLORIDE, SODIUM LACTATE, POTASSIUM CHLORIDE, CALCIUM CHLORIDE 600; 310; 30; 20 MG/100ML; MG/100ML; MG/100ML; MG/100ML
125 INJECTION, SOLUTION INTRAVENOUS CONTINUOUS
Status: DISCONTINUED | OUTPATIENT
Start: 2024-02-04 | End: 2024-02-05

## 2024-02-04 RX ORDER — GUAIFENESIN 600 MG/1
600 TABLET, EXTENDED RELEASE ORAL 2 TIMES DAILY PRN
Status: DISCONTINUED | OUTPATIENT
Start: 2024-02-04 | End: 2024-02-05

## 2024-02-04 RX ORDER — METOCLOPRAMIDE 10 MG/1
10 TABLET ORAL EVERY 6 HOURS PRN
Status: DISCONTINUED | OUTPATIENT
Start: 2024-02-04 | End: 2024-02-05

## 2024-02-04 RX ORDER — IBUPROFEN 600 MG/1
600 TABLET ORAL ONCE
Status: COMPLETED | OUTPATIENT
Start: 2024-02-04 | End: 2024-02-04

## 2024-02-04 RX ORDER — HYDRALAZINE HYDROCHLORIDE 20 MG/ML
5 INJECTION INTRAMUSCULAR; INTRAVENOUS ONCE AS NEEDED
Status: DISCONTINUED | OUTPATIENT
Start: 2024-02-04 | End: 2024-02-05

## 2024-02-04 RX ADMIN — NITROFURANTOIN MONOHYDRATE/MACROCRYSTALS 100 MG: 75; 25 CAPSULE ORAL at 21:43

## 2024-02-04 RX ADMIN — GUAIFENESIN 200 MG: 200 SOLUTION ORAL at 18:00

## 2024-02-04 RX ADMIN — LIDOCAINE HYDROCHLORIDE AND EPINEPHRINE 3 ML: 15; 5 INJECTION, SOLUTION EPIDURAL at 18:23

## 2024-02-04 RX ADMIN — LIDOCAINE HYDROCHLORIDE 5 ML: 10 INJECTION, SOLUTION EPIDURAL; INFILTRATION; INTRACAUDAL; PERINEURAL at 18:29

## 2024-02-04 RX ADMIN — OSELTAMIVIR PHOSPHATE 75 MG: 75 CAPSULE ORAL at 20:03

## 2024-02-04 RX ADMIN — ACETAMINOPHEN 975 MG: 325 TABLET ORAL at 20:03

## 2024-02-04 RX ADMIN — SODIUM CHLORIDE, SODIUM LACTATE, POTASSIUM CHLORIDE, AND CALCIUM CHLORIDE 500 ML: 600; 310; 30; 20 INJECTION, SOLUTION INTRAVENOUS at 17:45

## 2024-02-04 RX ADMIN — Medication 2 MILLI-UNITS/MIN: at 20:03

## 2024-02-04 RX ADMIN — IBUPROFEN 600 MG: 600 TABLET, FILM COATED ORAL at 22:00

## 2024-02-04 RX ADMIN — SODIUM CHLORIDE, SODIUM LACTATE, POTASSIUM CHLORIDE, AND CALCIUM CHLORIDE 125 ML/HR: 600; 310; 30; 20 INJECTION, SOLUTION INTRAVENOUS at 18:45

## 2024-02-04 RX ADMIN — LIDOCAINE HYDROCHLORIDE 1 ML: 10 INJECTION, SOLUTION INFILTRATION; PERINEURAL at 18:18

## 2024-02-04 RX ADMIN — Medication 10 ML/HR: at 18:40

## 2024-02-04 RX ADMIN — ONDANSETRON 4 MG: 2 INJECTION INTRAMUSCULAR; INTRAVENOUS at 22:30

## 2024-02-04 SDOH — SOCIAL STABILITY: SOCIAL INSECURITY: HAS ANYONE EVER THREATENED TO HURT YOUR FAMILY OR YOUR PETS?: NO

## 2024-02-04 SDOH — HEALTH STABILITY: MENTAL HEALTH: WERE YOU ABLE TO COMPLETE ALL THE BEHAVIORAL HEALTH SCREENINGS?: YES

## 2024-02-04 SDOH — SOCIAL STABILITY: SOCIAL INSECURITY: VERBAL ABUSE: DENIES

## 2024-02-04 SDOH — HEALTH STABILITY: MENTAL HEALTH: NON-SPECIFIC ACTIVE SUICIDAL THOUGHTS (PAST 1 MONTH): NO

## 2024-02-04 SDOH — SOCIAL STABILITY: SOCIAL INSECURITY: DO YOU FEEL ANYONE HAS EXPLOITED OR TAKEN ADVANTAGE OF YOU FINANCIALLY OR OF YOUR PERSONAL PROPERTY?: NO

## 2024-02-04 SDOH — SOCIAL STABILITY: SOCIAL INSECURITY: PHYSICAL ABUSE: DENIES

## 2024-02-04 SDOH — SOCIAL STABILITY: SOCIAL INSECURITY: ABUSE SCREEN: ADULT

## 2024-02-04 SDOH — SOCIAL STABILITY: SOCIAL INSECURITY: HAVE YOU HAD THOUGHTS OF HARMING ANYONE ELSE?: YES

## 2024-02-04 SDOH — SOCIAL STABILITY: SOCIAL INSECURITY: ARE YOU OR HAVE YOU BEEN THREATENED OR ABUSED PHYSICALLY, EMOTIONALLY, OR SEXUALLY BY ANYONE?: NO

## 2024-02-04 SDOH — HEALTH STABILITY: MENTAL HEALTH: WISH TO BE DEAD (PAST 1 MONTH): NO

## 2024-02-04 SDOH — HEALTH STABILITY: MENTAL HEALTH: CURRENT SMOKER: 0

## 2024-02-04 SDOH — ECONOMIC STABILITY: HOUSING INSECURITY: DO YOU FEEL UNSAFE GOING BACK TO THE PLACE WHERE YOU ARE LIVING?: NO

## 2024-02-04 SDOH — SOCIAL STABILITY: SOCIAL INSECURITY: ARE THERE ANY APPARENT SIGNS OF INJURIES/BEHAVIORS THAT COULD BE RELATED TO ABUSE/NEGLECT?: NO

## 2024-02-04 SDOH — SOCIAL STABILITY: SOCIAL INSECURITY: DOES ANYONE TRY TO KEEP YOU FROM HAVING/CONTACTING OTHER FRIENDS OR DOING THINGS OUTSIDE YOUR HOME?: NO

## 2024-02-04 SDOH — HEALTH STABILITY: MENTAL HEALTH: SUICIDAL BEHAVIOR (LIFETIME): NO

## 2024-02-04 ASSESSMENT — PATIENT HEALTH QUESTIONNAIRE - PHQ9
SUM OF ALL RESPONSES TO PHQ9 QUESTIONS 1 & 2: 0
2. FEELING DOWN, DEPRESSED OR HOPELESS: NOT AT ALL
1. LITTLE INTEREST OR PLEASURE IN DOING THINGS: NOT AT ALL

## 2024-02-04 ASSESSMENT — PAIN SCALES - GENERAL
PAINLEVEL_OUTOF10: 0 - NO PAIN
PAINLEVEL_OUTOF10: 2
PAINLEVEL_OUTOF10: 0 - NO PAIN
PAINLEVEL_OUTOF10: 6
PAINLEVEL_OUTOF10: 0 - NO PAIN

## 2024-02-04 ASSESSMENT — LIFESTYLE VARIABLES
HOW OFTEN DO YOU HAVE A DRINK CONTAINING ALCOHOL: NEVER
HOW OFTEN DO YOU HAVE 6 OR MORE DRINKS ON ONE OCCASION: NEVER
HOW MANY STANDARD DRINKS CONTAINING ALCOHOL DO YOU HAVE ON A TYPICAL DAY: PATIENT DOES NOT DRINK
AUDIT-C TOTAL SCORE: 0
SKIP TO QUESTIONS 9-10: 1
AUDIT-C TOTAL SCORE: 0

## 2024-02-04 ASSESSMENT — ACTIVITIES OF DAILY LIVING (ADL): LACK_OF_TRANSPORTATION: NO

## 2024-02-04 NOTE — H&P
Obstetrical Admission History and Physical     Veena Barone is a 28 y.o.  at 37w3d. QIAN: 2024, by Ultrasound. She has had prenatal care with [facility] .    Chief Complaint: No chief complaint on file.    Assessment/Plan    1) IUP at 37 weeks  2) SROM  3) Desires Sterilization  4) UTI suspected 2/3/24 - empiric treatment started.  5) Respiratory infection  -Expectant management  - Patient desires epidural  - GBS negative  -Discussed attempt for sterilization before discharge if COVID negative.    -Continue antibiotic for UTI until 48 results  -Sxs control for respiratory sxs.       Pregnancy Problems (from 25 to present)       No problems associated with this episode.       Options for delivery have been discussed with the patient and she elects a vaginal delivery.  Labor has been discussed in detail. The risks, benefits, complications, alternatives, expected outcomes, potential problems during recuperation and recovery, and the risks of not performing the procedure were discussed with the patient. The patient stated understanding that the risks of delivery include, but are not limited to: death; reaction to medications; injury to bowel, bladder, ureters, uterus, cervix, vagina, and other pelvic and abdominal structures, infection; blood loss and possible need for transfusion; and potential need for surgery, including hysterectomy. The risks of injury to the infant during delivery were also discussed. All questions were answered. The patient is wishing to continue with plans for a vaginal delivery.    Admit to inpatient status. I anticipate that this patient will require a stay exceeding at least 2 midnights for delivery and postpartum.  Active management of rupture of membranes.  Management of pregnancy complications, as indicated.    Subjective   Veena presents to Labor & Delivery with Spontaneous Rupture of Membranes of   clear fluid at 4 pm Good fetal movement. Denies vaginal bleeding.,  C/O of occasional contractions.   Patient c/o cold sxs, cough, congestion. Requesting cold medicine.  Desires sterilization     Obstetrical History   OB History    Para Term  AB Living   3 1 1 0 1 1   SAB IAB Ectopic Multiple Live Births   1 0 0 0 1      # Outcome Date GA Lbr Jared/2nd Weight Sex Delivery Anes PTL Lv   3 Current            2 SAB 2023     Complete      1 Term 19   3.43 kg F Vag-Spont   DEJA       Past Medical History  Past Medical History:   Diagnosis Date    Depression     Disorder of the skin and subcutaneous tissue, unspecified 2014    Skin lesion    Encounter for contraceptive management, unspecified 2015    Contraception management    Encounter for immunization 2014    Need for HPV vaccine    GERD (gastroesophageal reflux disease)     Hx of ovarian cyst     Left upper quadrant abdominal tenderness 2013    LUQ abdominal tenderness    Migraine     Obesity in pregnancy     Other chest pain     Pain, chest wall    Other urogenital candidiasis 09/15/2014    Urogenital candidiasis    Personal history of diseases of the skin and subcutaneous tissue 2015    History of urticaria    Personal history of other diseases of the digestive system 2014    History of constipation    Personal history of other diseases of the female genital tract 2014    History of vaginitis    Personal history of other diseases of the musculoskeletal system and connective tissue 2015    History of backache    Personal history of other diseases of the nervous system and sense organs 2015    History of acute otitis media    Personal history of other diseases of the respiratory system 2013    Personal history of acute sinusitis    Personal history of other specified conditions 2015    History of abdominal pain    Personal history of other specified conditions 2014    History of urinary frequency        Past Surgical History   No past surgical  history on file.    Social History  Social History     Tobacco Use    Smoking status: Never    Smokeless tobacco: Never   Substance Use Topics    Alcohol use: Never     Substance and Sexual Activity   Drug Use Never       Allergies  Patient has no known allergies.     Medications  Medications Prior to Admission   Medication Sig Dispense Refill Last Dose    nitrofurantoin, macrocrystal-monohydrate, (Macrobid) 100 mg capsule Take 1 capsule (100 mg) by mouth 2 times a day. 14 capsule 0     prenatal vit 49-iron fum-folic (Mini Prenatal) 6.75 mg iron- 200 mcg tablet as directed Orally       sertraline (Zoloft) 100 mg tablet Take 1.5 tablets (150 mg) by mouth once daily. 135 tablet 3        Objective    Visit Vitals  /78   Pulse (!) 117   Temp 36.5 °C (97.7 °F) (Oral)        Physical Examination  GENERAL: Examination reveals a well developed, well nourished, gravid female in no acute distress. She is alert and cooperative. Stable VS  LUNGS:normal effort  HEART: good pulses  ABDOMEN: soft, gravid, nontender, nondistended, no abnormal masses, no epigastric pain  FHR is 150 with moderate variability. Category I   [employer] reading:  Q2.5-3 min  The fetus is in a vertex presentation, determined by vaginal exam  VAGINA: normal appearing vagina with normal color and discharge and no lesions noted  CERVIX: Joshi's score of  , with cervix 5 cm dilated, 80 % effaced, -1 station,   in consistency,   in position.; MEMBRANES are  rutpured, moderate clear fluid.  EXTREMITIES: no redness or tenderness in the calves or thighs, no edema,    SKIN: normal coloration and turgor, no rashes  PSYCHOLOGICAL: awake and alert; oriented to person, place, and time.      Lab Review  pending

## 2024-02-04 NOTE — ANESTHESIA PROCEDURE NOTES
Epidural Block    Patient location during procedure: OB  Start time: 2/4/2024 6:15 PM  End time: 2/4/2024 6:40 PM  Reason for block: labor analgesia  Staffing  Performed: CRNA   Authorized by: JAKE Henley    Performed by: JAKE Henley    Preanesthetic Checklist  Completed: patient identified, IV checked, risks and benefits discussed, surgical consent, pre-op evaluation, timeout performed and sterile techniques followed  Block Timeout  RN/Licensed healthcare professional reads aloud to the Anesthesia provider and entire team: Patient identity, procedure with side and site, patient position, and as applicable the availability of implants/special equipment/special requirements.  Patient on coagulant treatment: no  Timeout performed at: 2/4/2024 6:18 PM  Block Placement  Patient position: sitting  Prep: Betadine  Sterility prep: cap, drape, gloves, hand and mask  Sedation level: no sedation  Patient monitoring: blood pressure, continuous pulse oximetry and heart rate  Approach: midline  Local numbing: lidocaine 1% to skin and subcutaneous tissues  Vertebral space: lumbar  Lumbar location: L3-L4  Epidural  Loss of resistance technique: saline  Guidance: landmark technique        Needle  Needle type: Tuohy   Needle gauge: 17  Needle length: 9 cm  Needle insertion depth: 7 cm  Catheter type: multi-orifice  Catheter size: 19 G  Catheter at skin depth: 12 cm  Catheter securement method: clear occlusive dressing and surgical tape    Test dose: lidocaine 1.5% with epinephrine 1-to-200,000  Test dose: lidocaine 1.5% with epinephrine 1-to-200,000  Test dose result: no positive test dose    PCEA  Medication concentration used: 0.2% Ropivacaine with 2 mcg/mL Fentanyl  Dose (mL): 4  Lockout (minutes): 20  1-Hour Limit (boluses/hr): 3  Basal Rate: 10        Assessment  Sensory level: T6 bilateral  Block outcome: patient comfortable  Number of attempts: 1  Events: no positive test dose  Procedure assessment:  patient tolerated procedure well with no immediate complications

## 2024-02-04 NOTE — ANESTHESIA PREPROCEDURE EVALUATION
Patient: Veena Barone    Evaluation Method: In-person visit    Procedure Information    Date: 02/04/24  Procedure: Labor Analgesia         Relevant Problems   Cardiovascular   (+) Breast pain, left      GI   (+) Gastroesophageal reflux disease   (+) Irritable bowel syndrome      /Renal   (+) Pyelonephritis      Neuro/Psych   (+) Anxiety   (+) Major depressive disorder, single episode, unspecified      Infectious Disease   (+) Pyelonephritis   (+) Yeast vaginitis       Clinical information reviewed:    Allergies  Meds   Med Hx             NPO Detail:  NPO/Void Status  Date of Last Liquid: 02/03/24         OB/Gyn Evaluation    Present Pregnancy    Patient is pregnant now.   Obstetric History                Physical Exam    Airway  Mallampati: II  TM distance: >3 FB  Neck ROM: full     Cardiovascular - normal exam  Rhythm: regular  Rate: normal     Dental - normal exam     Pulmonary - normal exam  Breath sounds clear to auscultation     Abdominal      Other findings: Patient congested on admission to L&D. Swab came back (+) for FLU-B. Lung sounds clear. Sputum clear to pale yellow. Patient also (+) for UTI, currently taking Macrobid. Temp just priuor to epidural 37.6C          Anesthesia Plan    History of general anesthesia?: no  History of complications of general anesthesia?: no    ASA 2     epidural     The patient is not a current smoker.    Anesthetic plan and risks discussed with patient.  Use of blood products discussed with who consented to blood products.    Plan discussed with CRNA.

## 2024-02-05 LAB
FETAL MATERNAL SCREEN: NORMAL
TREPONEMA PALLIDUM IGG+IGM AB [PRESENCE] IN SERUM OR PLASMA BY IMMUNOASSAY: NONREACTIVE

## 2024-02-05 PROCEDURE — 1220000001 HC OB SEMI-PRIVATE ROOM DAILY

## 2024-02-05 PROCEDURE — 2500000001 HC RX 250 WO HCPCS SELF ADMINISTERED DRUGS (ALT 637 FOR MEDICARE OP): Performed by: OBSTETRICS & GYNECOLOGY

## 2024-02-05 PROCEDURE — 7210000002 HC LABOR PER HOUR

## 2024-02-05 PROCEDURE — 2500000004 HC RX 250 GENERAL PHARMACY W/ HCPCS (ALT 636 FOR OP/ED): Performed by: OBSTETRICS & GYNECOLOGY

## 2024-02-05 PROCEDURE — 59409 OBSTETRICAL CARE: CPT | Performed by: OBSTETRICS & GYNECOLOGY

## 2024-02-05 PROCEDURE — 36415 COLL VENOUS BLD VENIPUNCTURE: CPT | Performed by: OBSTETRICS & GYNECOLOGY

## 2024-02-05 PROCEDURE — 59400 OBSTETRICAL CARE: CPT | Performed by: OBSTETRICS & GYNECOLOGY

## 2024-02-05 PROCEDURE — 85461 HEMOGLOBIN FETAL: CPT

## 2024-02-05 PROCEDURE — 7100000016 HC LABOR RECOVERY PER HOUR

## 2024-02-05 RX ORDER — ONDANSETRON 4 MG/1
4 TABLET, FILM COATED ORAL EVERY 6 HOURS PRN
Status: DISCONTINUED | OUTPATIENT
Start: 2024-02-05 | End: 2024-02-07 | Stop reason: HOSPADM

## 2024-02-05 RX ORDER — KETOROLAC TROMETHAMINE 30 MG/ML
30 INJECTION, SOLUTION INTRAMUSCULAR; INTRAVENOUS EVERY 6 HOURS
Status: COMPLETED | OUTPATIENT
Start: 2024-02-05 | End: 2024-02-05

## 2024-02-05 RX ORDER — TRANEXAMIC ACID 100 MG/ML
1000 INJECTION, SOLUTION INTRAVENOUS ONCE AS NEEDED
Status: DISCONTINUED | OUTPATIENT
Start: 2024-02-05 | End: 2024-02-07 | Stop reason: HOSPADM

## 2024-02-05 RX ORDER — POLYETHYLENE GLYCOL 3350 17 G/17G
17 POWDER, FOR SOLUTION ORAL 2 TIMES DAILY PRN
Status: DISCONTINUED | OUTPATIENT
Start: 2024-02-05 | End: 2024-02-07 | Stop reason: HOSPADM

## 2024-02-05 RX ORDER — OXYTOCIN 10 [USP'U]/ML
10 INJECTION, SOLUTION INTRAMUSCULAR; INTRAVENOUS ONCE AS NEEDED
Status: DISCONTINUED | OUTPATIENT
Start: 2024-02-05 | End: 2024-02-07 | Stop reason: HOSPADM

## 2024-02-05 RX ORDER — BISACODYL 10 MG/1
10 SUPPOSITORY RECTAL DAILY PRN
Status: DISCONTINUED | OUTPATIENT
Start: 2024-02-05 | End: 2024-02-07 | Stop reason: HOSPADM

## 2024-02-05 RX ORDER — ACETAMINOPHEN 325 MG/1
975 TABLET ORAL EVERY 6 HOURS SCHEDULED
Status: DISCONTINUED | OUTPATIENT
Start: 2024-02-05 | End: 2024-02-07 | Stop reason: HOSPADM

## 2024-02-05 RX ORDER — CARBOPROST TROMETHAMINE 250 UG/ML
250 INJECTION, SOLUTION INTRAMUSCULAR ONCE AS NEEDED
Status: DISCONTINUED | OUTPATIENT
Start: 2024-02-05 | End: 2024-02-07 | Stop reason: HOSPADM

## 2024-02-05 RX ORDER — GUAIFENESIN 600 MG/1
600 TABLET, EXTENDED RELEASE ORAL 2 TIMES DAILY PRN
Status: DISCONTINUED | OUTPATIENT
Start: 2024-02-05 | End: 2024-02-07 | Stop reason: HOSPADM

## 2024-02-05 RX ORDER — GUAIFENESIN 100 MG/5ML
200 SOLUTION ORAL EVERY 4 HOURS PRN
Status: DISCONTINUED | OUTPATIENT
Start: 2024-02-05 | End: 2024-02-07 | Stop reason: HOSPADM

## 2024-02-05 RX ORDER — AMOXICILLIN 250 MG
2 CAPSULE ORAL NIGHTLY PRN
Status: DISCONTINUED | OUTPATIENT
Start: 2024-02-05 | End: 2024-02-07 | Stop reason: HOSPADM

## 2024-02-05 RX ORDER — SIMETHICONE 80 MG
80 TABLET,CHEWABLE ORAL 4 TIMES DAILY PRN
Status: DISCONTINUED | OUTPATIENT
Start: 2024-02-05 | End: 2024-02-07 | Stop reason: HOSPADM

## 2024-02-05 RX ORDER — LOPERAMIDE HYDROCHLORIDE 2 MG/1
4 CAPSULE ORAL EVERY 2 HOUR PRN
Status: DISCONTINUED | OUTPATIENT
Start: 2024-02-05 | End: 2024-02-07 | Stop reason: HOSPADM

## 2024-02-05 RX ORDER — DIPHENHYDRAMINE HCL 25 MG
25 CAPSULE ORAL EVERY 4 HOURS PRN
Status: DISCONTINUED | OUTPATIENT
Start: 2024-02-05 | End: 2024-02-07 | Stop reason: HOSPADM

## 2024-02-05 RX ORDER — OXYCODONE HYDROCHLORIDE 5 MG/1
10 TABLET ORAL EVERY 4 HOURS PRN
Status: DISCONTINUED | OUTPATIENT
Start: 2024-02-06 | End: 2024-02-07 | Stop reason: HOSPADM

## 2024-02-05 RX ORDER — NIFEDIPINE 10 MG/1
10 CAPSULE ORAL ONCE AS NEEDED
Status: DISCONTINUED | OUTPATIENT
Start: 2024-02-05 | End: 2024-02-07 | Stop reason: HOSPADM

## 2024-02-05 RX ORDER — MISOPROSTOL 200 UG/1
800 TABLET ORAL ONCE AS NEEDED
Status: DISCONTINUED | OUTPATIENT
Start: 2024-02-05 | End: 2024-02-07 | Stop reason: HOSPADM

## 2024-02-05 RX ORDER — OXYCODONE HYDROCHLORIDE 5 MG/1
5 TABLET ORAL EVERY 4 HOURS PRN
Status: DISCONTINUED | OUTPATIENT
Start: 2024-02-06 | End: 2024-02-07 | Stop reason: HOSPADM

## 2024-02-05 RX ORDER — LIDOCAINE 560 MG/1
1 PATCH PERCUTANEOUS; TOPICAL; TRANSDERMAL
Status: DISCONTINUED | OUTPATIENT
Start: 2024-02-05 | End: 2024-02-07 | Stop reason: HOSPADM

## 2024-02-05 RX ORDER — METHYLERGONOVINE MALEATE 0.2 MG/ML
0.2 INJECTION INTRAVENOUS ONCE AS NEEDED
Status: DISCONTINUED | OUTPATIENT
Start: 2024-02-05 | End: 2024-02-07 | Stop reason: HOSPADM

## 2024-02-05 RX ORDER — ONDANSETRON HYDROCHLORIDE 2 MG/ML
4 INJECTION, SOLUTION INTRAVENOUS EVERY 6 HOURS PRN
Status: DISCONTINUED | OUTPATIENT
Start: 2024-02-05 | End: 2024-02-07 | Stop reason: HOSPADM

## 2024-02-05 RX ORDER — ADHESIVE BANDAGE
30 BANDAGE TOPICAL
Status: DISCONTINUED | OUTPATIENT
Start: 2024-02-05 | End: 2024-02-07 | Stop reason: HOSPADM

## 2024-02-05 RX ORDER — DIPHENHYDRAMINE HYDROCHLORIDE 50 MG/ML
25 INJECTION INTRAMUSCULAR; INTRAVENOUS EVERY 4 HOURS PRN
Status: DISCONTINUED | OUTPATIENT
Start: 2024-02-05 | End: 2024-02-07 | Stop reason: HOSPADM

## 2024-02-05 RX ORDER — IBUPROFEN 600 MG/1
600 TABLET ORAL EVERY 6 HOURS
Status: DISCONTINUED | OUTPATIENT
Start: 2024-02-05 | End: 2024-02-07 | Stop reason: HOSPADM

## 2024-02-05 RX ORDER — NITROFURANTOIN 25; 75 MG/1; MG/1
100 CAPSULE ORAL EVERY 12 HOURS SCHEDULED
Status: DISCONTINUED | OUTPATIENT
Start: 2024-02-05 | End: 2024-02-05

## 2024-02-05 RX ORDER — OXYTOCIN/0.9 % SODIUM CHLORIDE 30/500 ML
60 PLASTIC BAG, INJECTION (ML) INTRAVENOUS ONCE AS NEEDED
Status: DISCONTINUED | OUTPATIENT
Start: 2024-02-05 | End: 2024-02-07 | Stop reason: HOSPADM

## 2024-02-05 RX ORDER — OSELTAMIVIR PHOSPHATE 75 MG/1
75 CAPSULE ORAL 2 TIMES DAILY
Status: DISCONTINUED | OUTPATIENT
Start: 2024-02-05 | End: 2024-02-07 | Stop reason: HOSPADM

## 2024-02-05 RX ORDER — HYDRALAZINE HYDROCHLORIDE 20 MG/ML
5 INJECTION INTRAMUSCULAR; INTRAVENOUS ONCE AS NEEDED
Status: DISCONTINUED | OUTPATIENT
Start: 2024-02-05 | End: 2024-02-07 | Stop reason: HOSPADM

## 2024-02-05 RX ORDER — LABETALOL HYDROCHLORIDE 5 MG/ML
20 INJECTION, SOLUTION INTRAVENOUS ONCE AS NEEDED
Status: DISCONTINUED | OUTPATIENT
Start: 2024-02-05 | End: 2024-02-07 | Stop reason: HOSPADM

## 2024-02-05 RX ORDER — NALOXONE HYDROCHLORIDE 0.4 MG/ML
0.1 INJECTION, SOLUTION INTRAMUSCULAR; INTRAVENOUS; SUBCUTANEOUS EVERY 5 MIN PRN
Status: DISCONTINUED | OUTPATIENT
Start: 2024-02-05 | End: 2024-02-07 | Stop reason: HOSPADM

## 2024-02-05 RX ADMIN — NITROFURANTOIN MONOHYDRATE/MACROCRYSTALS 100 MG: 75; 25 CAPSULE ORAL at 13:35

## 2024-02-05 RX ADMIN — ACETAMINOPHEN 975 MG: 325 TABLET ORAL at 20:29

## 2024-02-05 RX ADMIN — ACETAMINOPHEN 975 MG: 325 TABLET ORAL at 15:33

## 2024-02-05 RX ADMIN — ACETAMINOPHEN 975 MG: 325 TABLET ORAL at 08:29

## 2024-02-05 RX ADMIN — OSELTAMIVIR PHOSPHATE 75 MG: 75 CAPSULE ORAL at 10:11

## 2024-02-05 RX ADMIN — OSELTAMIVIR PHOSPHATE 75 MG: 75 CAPSULE ORAL at 20:34

## 2024-02-05 RX ADMIN — HUMAN RHO(D) IMMUNE GLOBULIN 300 MCG: 300 INJECTION, SOLUTION INTRAMUSCULAR at 20:30

## 2024-02-05 RX ADMIN — GUAIFENESIN 600 MG: 600 TABLET ORAL at 04:31

## 2024-02-05 RX ADMIN — KETOROLAC TROMETHAMINE 30 MG: 30 INJECTION, SOLUTION INTRAMUSCULAR; INTRAVENOUS at 15:34

## 2024-02-05 RX ADMIN — KETOROLAC TROMETHAMINE 30 MG: 30 INJECTION, SOLUTION INTRAMUSCULAR; INTRAVENOUS at 08:29

## 2024-02-05 RX ADMIN — KETOROLAC TROMETHAMINE 30 MG: 30 INJECTION, SOLUTION INTRAMUSCULAR; INTRAVENOUS at 02:29

## 2024-02-05 RX ADMIN — IBUPROFEN 600 MG: 600 TABLET, FILM COATED ORAL at 20:30

## 2024-02-05 RX ADMIN — ACETAMINOPHEN 975 MG: 325 TABLET ORAL at 02:18

## 2024-02-05 ASSESSMENT — PAIN DESCRIPTION - DESCRIPTORS
DESCRIPTORS: CRAMPING

## 2024-02-05 ASSESSMENT — PAIN SCALES - GENERAL
PAINLEVEL_OUTOF10: 0 - NO PAIN
PAINLEVEL_OUTOF10: 3
PAIN_LEVEL: 1
PAINLEVEL_OUTOF10: 6
PAINLEVEL_OUTOF10: 0 - NO PAIN
PAINLEVEL_OUTOF10: 5 - MODERATE PAIN
PAINLEVEL_OUTOF10: 3
PAINLEVEL_OUTOF10: 0 - NO PAIN

## 2024-02-05 NOTE — ANESTHESIA POSTPROCEDURE EVALUATION
Patient: Veena Barone    Procedure Summary       Date: 02/04/24 Room / Location:     Anesthesia Start: 1815 Anesthesia Stop: 02/05/24 0126    Procedure: Labor Analgesia Diagnosis:     Scheduled Providers:  Responsible Provider: JAKE Henley    Anesthesia Type: epidural ASA Status: 2            Anesthesia Type: epidural    Vitals Value Taken Time   /82 02/05/24 0953   Temp 36.8 02/05/24 0953   Pulse 95 02/05/24 0953   Resp 17 02/05/24 0953   SpO2 99 02/05/24 0953       Anesthesia Post Evaluation    Patient location during evaluation: floor  Patient participation: complete - patient participated  Level of consciousness: awake and alert  Pain score: 1  Pain management: adequate  Multimodal analgesia pain management approach  Airway patency: patent  Cardiovascular status: acceptable  Respiratory status: acceptable and room air  Hydration status: acceptable  Postoperative Nausea and Vomiting: none      No notable events documented.

## 2024-02-05 NOTE — L&D DELIVERY NOTE
OB Delivery Note  2024  Veena Barone  28 y.o.   Vaginal, Spontaneous        Gestational Age: 37w4d  /Para:   Quantitative Blood Loss: Admission to Discharge: 262 mL (2024  4:51 PM - 2024  9:33 AM)    Melly Barone [[id number]]      Labor Events    Rupture date/time: 2024 1600  Rupture type: Spontaneous  Fluid color: Clear  Labor type: Spontaneous Onset of Labor  Labor allowed to proceed with plans for an attempted vaginal birth?: Yes  Augmentation: Oxytocin  Augmentation indications: Ineffective Contraction Pattern  Complications: None       Labor Length    3rd stage: 0h 03m       Placenta    Placenta delivery date/time: 2024 0129  Placenta removal: Expressed  Placenta appearance: Intact       Cord    Vessels: 3 vessels  Complications: None  Delayed cord clamping?: No  Cord blood disposition: Lab  Gases sent?: No  Stem cell collection (by provider): No       Lacerations    Episiotomy: None  Perineal laceration: None  Other lacerations?: No  Repair suture: None       Anesthesia    Method: Epidural       Operative Delivery    Forceps attempted?: No  Vacuum extractor attempted?: No       Shoulder Dystocia    Shoulder dystocia present?: No       [place] Delivery    Birth date/time: 2024 01:26:00  Delivery type: Vaginal, Spontaneous  Complications: None       Resuscitation    Method: None       Apgars    Living status: Living  Apgar Component Scores:  1 min.:  5 min.:  10 min.:  15 min.:  20 min.:    Skin color:  1  1       Heart rate:  2  2       Reflex irritability:  2  2       Muscle tone:  2  2       Respiratory effort:  1  2       Total:  8  9       Apgars assigned by: MIGUEL GIPSON RN       Delivery Providers    Delivering clinician: Yesenia Alexandre MD   Provider Role    Irving Correia RN Delivery Nurse    Jeana Gipson RN Nursery Nurse     Resident                 Yesenia Alexandre MD

## 2024-02-05 NOTE — PROGRESS NOTES
I performed a sepsis reperfusion exam on Veena Barone on 2/5/2024 4:14 PM    If adult patient did not receive a 30cc/kg fluid bolus, the clinical rationale is ***    Ana Glynn RN

## 2024-02-05 NOTE — PROGRESS NOTES
Intrapartum Progress Note    Assessment/Plan   Veena Barone is a 28 y.o.  at 37w3d. QIAN: 2024, by Ultrasound.   Early labor with SROM, >37 weeks - augmentation started  Influenza B - tylenol and Tamiflu ordered  Suspected UTI - not confirmed. Continue outpatient tx until 48 cx results  Obesity in pregnancy  Sterilization desired - delay elective procedure to 6 weeks pp due to viral illness    Principal Problem:    Normal labor    Pregnancy Problems (from 25 to present)       No problems associated with this episode.            Subjective   Patient with respirator sxs. Comfortable with epidural.     Objective   Last Vitals:  Temp Pulse Resp BP MAP Pulse Ox   (!) 38.4 °C (101.1 °F) (!) 122 18 127/63   100 %     Vitals Min/Max Last 24 Hours:  Temp  Min: 36.5 °C (97.7 °F)  Max: 38.4 °C (101.1 °F)  Pulse  Min: 90  Max: 122  Resp  Min: 18  Max: 18  BP  Min: 127/63  Max: 143/85    Intake/Output:  No intake or output data in the 24 hours ending 24    Physical Examination:  GENERAL: Examination reveals a well developed, well nourished and obese, gravid female She is alert and cooperative.  159 with Accelerations, and a Category I tracing.    [place] readin-4.5min  CERVIX: unchanged      Lab Review:  Lab Results   Component Value Date    ABO B 2024    LABRH NEG 2024    ABSCRN NEG 2024     Lab Results   Component Value Date    WBC 8.7 2024    HGB 10.7 (L) 2024    HCT 31.2 (L) 2024     2024   COVID/Influenza screen:    Positive for Influenza B    Negative GBS

## 2024-02-06 PROBLEM — Z37.9 NORMAL LABOR (HHS-HCC): Status: RESOLVED | Noted: 2024-02-04 | Resolved: 2024-02-06

## 2024-02-06 PROBLEM — J10.1 INFLUENZA B: Status: ACTIVE | Noted: 2024-02-06

## 2024-02-06 PROBLEM — O42.90 PREMATURE RUPTURE OF MEMBRANES (HHS-HCC): Status: ACTIVE | Noted: 2024-02-06

## 2024-02-06 LAB — S PYO DNA THROAT QL NAA+PROBE: NOT DETECTED

## 2024-02-06 PROCEDURE — 87651 STREP A DNA AMP PROBE: CPT | Performed by: OBSTETRICS & GYNECOLOGY

## 2024-02-06 PROCEDURE — 2500000004 HC RX 250 GENERAL PHARMACY W/ HCPCS (ALT 636 FOR OP/ED): Performed by: OBSTETRICS & GYNECOLOGY

## 2024-02-06 PROCEDURE — 2500000001 HC RX 250 WO HCPCS SELF ADMINISTERED DRUGS (ALT 637 FOR MEDICARE OP): Performed by: OBSTETRICS & GYNECOLOGY

## 2024-02-06 PROCEDURE — 1220000001 HC OB SEMI-PRIVATE ROOM DAILY

## 2024-02-06 RX ADMIN — IBUPROFEN 600 MG: 600 TABLET, FILM COATED ORAL at 08:26

## 2024-02-06 RX ADMIN — ACETAMINOPHEN 975 MG: 325 TABLET ORAL at 10:53

## 2024-02-06 RX ADMIN — PHENOL 1 SPRAY: 1.5 LIQUID ORAL at 12:32

## 2024-02-06 RX ADMIN — ACETAMINOPHEN 975 MG: 325 TABLET ORAL at 16:18

## 2024-02-06 RX ADMIN — GUAIFENESIN 600 MG: 600 TABLET ORAL at 01:28

## 2024-02-06 RX ADMIN — ACETAMINOPHEN 975 MG: 325 TABLET ORAL at 03:55

## 2024-02-06 RX ADMIN — OSELTAMIVIR PHOSPHATE 75 MG: 75 CAPSULE ORAL at 08:27

## 2024-02-06 RX ADMIN — PHENOL 1 SPRAY: 1.5 LIQUID ORAL at 21:06

## 2024-02-06 RX ADMIN — PHENOL 1 SPRAY: 1.5 LIQUID ORAL at 16:20

## 2024-02-06 RX ADMIN — IBUPROFEN 600 MG: 600 TABLET, FILM COATED ORAL at 14:28

## 2024-02-06 RX ADMIN — ACETAMINOPHEN 975 MG: 325 TABLET ORAL at 21:32

## 2024-02-06 RX ADMIN — OSELTAMIVIR PHOSPHATE 75 MG: 75 CAPSULE ORAL at 21:05

## 2024-02-06 RX ADMIN — GUAIFENESIN 600 MG: 600 TABLET ORAL at 14:30

## 2024-02-06 RX ADMIN — IBUPROFEN 600 MG: 600 TABLET, FILM COATED ORAL at 01:45

## 2024-02-06 RX ADMIN — IBUPROFEN 600 MG: 600 TABLET, FILM COATED ORAL at 21:05

## 2024-02-06 ASSESSMENT — PAIN SCALES - GENERAL
PAINLEVEL_OUTOF10: 9
PAINLEVEL_OUTOF10: 6
PAINLEVEL_OUTOF10: 4
PAINLEVEL_OUTOF10: 6
PAINLEVEL_OUTOF10: 0 - NO PAIN
PAINLEVEL_OUTOF10: 8

## 2024-02-06 ASSESSMENT — PAIN DESCRIPTION - LOCATION
LOCATION: THROAT
LOCATION: THROAT

## 2024-02-06 NOTE — CARE PLAN
The patient's goals for the shift include pain management    The clinical goals for the shift include pain management    Problem: Postpartum  Goal: Experiences normal postpartum course  Outcome: Progressing  Goal: Appropriate maternal -  bonding  Outcome: Progressing  Goal: Establish and maintain infant feeding pattern for adequate nutrition  Outcome: Progressing  Goal: Incisions, wounds, or drain sites healing without S/S of infection  Outcome: Progressing  Goal: No s/sx infection  Outcome: Progressing  Goal: No s/sx of hemorrhage  Outcome: Progressing  Goal: Minimal s/sx of HDP and BP<160/110  Outcome: Progressing     Problem: Pain  Goal: Takes deep breaths with improved pain control throughout the shift  Outcome: Progressing  Goal: Turns in bed with improved pain control throughout the shift  Outcome: Progressing  Goal: Walks with improved pain control throughout the shift  Outcome: Progressing  Goal: Performs ADL's with improved pain control throughout shift  Outcome: Progressing  Goal: Participates in PT with improved pain control throughout the shift  Outcome: Progressing  Goal: Free from opioid side effects throughout the shift  Outcome: Progressing  Goal: Free from acute confusion related to pain meds throughout the shift  Outcome: Progressing

## 2024-02-06 NOTE — PROGRESS NOTES
Report received at this time. I nurse contacted MD Alexandre regarding Sepsis watch being activated on chart by previous nurse. Orders to not activate Sepsis protocol due to known diagnosis.

## 2024-02-06 NOTE — PROGRESS NOTES
Postpartum Progress Note    Assessment/Plan   Veena Barone is a 28 y.o., , who delivered at 37w4d gestation and is now postpartum day 1,  of a viable female.  -Patient has flu syndrome: cough, nasal congestion, and now sore throat. Antiviral and comfort medications are helping. She is ready for discharge this evening if her baby is cleared to go home.   -Reviewed medications/Rxs, pelvic rest, and follow-up.  -Patient desires sterilization. Could not complete postpartum due to flu.   -Collected strep culture to r/o bacterial vs viral pharyngitis that she c/o.    Active Problems:    Influenza B    Premature rupture of membranes    Pregnancy Problems (from 25 to present)       No problems associated with this episode.          Hospital course: no complications  Vaginal Birth  Patient is not breastfeedingThe patient's blood type is B NEG. The baby's blood type is O POS . Rhogam indicated and given.    Subjective   Her pain is well controlled with current medications. She has flu symptoms, controlled with medications, except for sore throat. No c/o HA, CP, SOB or leg pain.  She is ambulating well  She is tolerating a Adult diet Regular  She reports no breast or nursing problems  She denies emotional concerns today   Her plan for contraception is tubal ligation     Objective   Allergies:   Patient has no known allergies.         Last Vitals:  Temp Pulse Resp BP MAP Pulse Ox   36.9 °C (98.4 °F) 85 15 (!) 143/87   100 %     Vitals Min/Max Last 24 Hours:  Temp  Min: 36.7 °C (98.1 °F)  Max: 36.9 °C (98.4 °F)  Pulse  Min: 82  Max: 98  Resp  Min: 15  Max: 18  BP  Min: 118/80  Max: 143/87    Physical Exam:  General exam: Vital signs are stable.  Patient is pleasantly obese.  Bonding with her baby at this time.  He has been afebrile in more than 24 hours.  HEENT: Normocephalic atraumatic.  Definite improvement.  Heart regular rate and rhythm with S1 and S2 without murmur  Lungs clear to auscultation  bilaterally with no rales rhonchi or wheezes.  Abdomen is soft, obese, nontender  : Fundus is firm and mildly tender my expected, -2 below umbilicus.  Extremities with trace edema bilaterally.  Calves nontender.    Lab Data:  Lab Results   Component Value Date    ABO B 02/04/2024    LABRH NEG 02/04/2024    ABSCRN NEG 02/04/2024     Lab Results   Component Value Date    WBC 8.7 02/04/2024    HGB 10.7 (L) 02/04/2024    HCT 31.2 (L) 02/04/2024     02/04/2024

## 2024-02-06 NOTE — DISCHARGE INSTRUCTIONS
1.  Pelvic rest: 6 weeks = nothing in the vagina (no tub bath; no sex; no tampons; no douches).   2.  No lifting over 25 pounds.  3.  Call the office for heavy bleeding, changing your pad more than every 3 hours; fever of 100.4 or greater; increasing pain not controlled by prescriptions; breast complications; problems with obstetrical laceration/episiotomy repair or  wound if applicable.  4. For your prescriptions or over the counter medications:  Take them as directed on the bottles or packages.  Please call for intolerable or severe side-effects.   5.  Don't drive for 1 week postpartum.  6. Perineal care: Gently cleanse areas. Water, Witch Hazel etc

## 2024-02-07 ENCOUNTER — TELEPHONE (OUTPATIENT)
Dept: OBSTETRICS AND GYNECOLOGY | Facility: CLINIC | Age: 29
End: 2024-02-07
Payer: COMMERCIAL

## 2024-02-07 VITALS
TEMPERATURE: 98.2 F | HEART RATE: 89 BPM | DIASTOLIC BLOOD PRESSURE: 72 MMHG | SYSTOLIC BLOOD PRESSURE: 144 MMHG | RESPIRATION RATE: 17 BRPM | OXYGEN SATURATION: 99 %

## 2024-02-07 PROBLEM — O42.90 PREMATURE RUPTURE OF MEMBRANES (HHS-HCC): Status: RESOLVED | Noted: 2024-02-06 | Resolved: 2024-02-07

## 2024-02-07 LAB
ALBUMIN SERPL-MCNC: 3 G/DL (ref 3.5–5)
ALP BLD-CCNC: 134 U/L (ref 35–125)
ALT SERPL-CCNC: 8 U/L (ref 5–40)
ANION GAP SERPL CALC-SCNC: 11 MMOL/L
AST SERPL-CCNC: 19 U/L (ref 5–40)
BASOPHILS # BLD AUTO: 0.02 X10*3/UL (ref 0–0.1)
BASOPHILS NFR BLD AUTO: 0.2 %
BILIRUB SERPL-MCNC: 0.2 MG/DL (ref 0.1–1.2)
BUN SERPL-MCNC: 10 MG/DL (ref 8–25)
CALCIUM SERPL-MCNC: 8.3 MG/DL (ref 8.5–10.4)
CHLORIDE SERPL-SCNC: 111 MMOL/L (ref 97–107)
CO2 SERPL-SCNC: 17 MMOL/L (ref 24–31)
CREAT SERPL-MCNC: 0.7 MG/DL (ref 0.4–1.6)
CREAT UR-MCNC: 71.6 MG/DL
EGFRCR SERPLBLD CKD-EPI 2021: >90 ML/MIN/1.73M*2
EOSINOPHIL # BLD AUTO: 0.03 X10*3/UL (ref 0–0.7)
EOSINOPHIL NFR BLD AUTO: 0.3 %
ERYTHROCYTE [DISTWIDTH] IN BLOOD BY AUTOMATED COUNT: 12.9 % (ref 11.5–14.5)
GLUCOSE SERPL-MCNC: 96 MG/DL (ref 65–99)
HCT VFR BLD AUTO: 30.8 % (ref 36–46)
HGB BLD-MCNC: 10.6 G/DL (ref 12–16)
IMM GRANULOCYTES # BLD AUTO: 0.03 X10*3/UL (ref 0–0.7)
IMM GRANULOCYTES NFR BLD AUTO: 0.3 % (ref 0–0.9)
LACTATE BLDV-SCNC: 1.2 MMOL/L (ref 0.4–2)
LDH SERPL-CCNC: 266 U/L (ref 91–227)
LYMPHOCYTES # BLD AUTO: 2.36 X10*3/UL (ref 1.2–4.8)
LYMPHOCYTES NFR BLD AUTO: 26.8 %
MCH RBC QN AUTO: 29.6 PG (ref 26–34)
MCHC RBC AUTO-ENTMCNC: 34.4 G/DL (ref 32–36)
MCV RBC AUTO: 86 FL (ref 80–100)
MONOCYTES # BLD AUTO: 0.43 X10*3/UL (ref 0.1–1)
MONOCYTES NFR BLD AUTO: 4.9 %
NEUTROPHILS # BLD AUTO: 5.93 X10*3/UL (ref 1.2–7.7)
NEUTROPHILS NFR BLD AUTO: 67.5 %
NRBC BLD-RTO: 0 /100 WBCS (ref 0–0)
PLATELET # BLD AUTO: 192 X10*3/UL (ref 150–450)
POTASSIUM SERPL-SCNC: 4.1 MMOL/L (ref 3.4–5.1)
PROT SERPL-MCNC: 5.7 G/DL (ref 5.9–7.9)
PROT UR-MCNC: 22 MG/DL
PROT/CREAT UR: 0.31 MG/MG CREAT
RBC # BLD AUTO: 3.58 X10*6/UL (ref 4–5.2)
SODIUM SERPL-SCNC: 139 MMOL/L (ref 133–145)
URATE SERPL-MCNC: 7.1 MG/DL (ref 2.5–6.8)
WBC # BLD AUTO: 8.8 X10*3/UL (ref 4.4–11.3)

## 2024-02-07 PROCEDURE — 2500000001 HC RX 250 WO HCPCS SELF ADMINISTERED DRUGS (ALT 637 FOR MEDICARE OP): Performed by: OBSTETRICS & GYNECOLOGY

## 2024-02-07 PROCEDURE — 84460 ALANINE AMINO (ALT) (SGPT): CPT | Performed by: OBSTETRICS & GYNECOLOGY

## 2024-02-07 PROCEDURE — 59050 FETAL MONITOR W/REPORT: CPT

## 2024-02-07 PROCEDURE — 85025 COMPLETE CBC W/AUTO DIFF WBC: CPT | Performed by: OBSTETRICS & GYNECOLOGY

## 2024-02-07 PROCEDURE — 83615 LACTATE (LD) (LDH) ENZYME: CPT | Performed by: OBSTETRICS & GYNECOLOGY

## 2024-02-07 PROCEDURE — 84550 ASSAY OF BLOOD/URIC ACID: CPT | Performed by: OBSTETRICS & GYNECOLOGY

## 2024-02-07 PROCEDURE — 36415 COLL VENOUS BLD VENIPUNCTURE: CPT | Performed by: OBSTETRICS & GYNECOLOGY

## 2024-02-07 PROCEDURE — 83605 ASSAY OF LACTIC ACID: CPT | Performed by: OBSTETRICS & GYNECOLOGY

## 2024-02-07 PROCEDURE — 2500000004 HC RX 250 GENERAL PHARMACY W/ HCPCS (ALT 636 FOR OP/ED): Performed by: OBSTETRICS & GYNECOLOGY

## 2024-02-07 PROCEDURE — 82570 ASSAY OF URINE CREATININE: CPT | Performed by: OBSTETRICS & GYNECOLOGY

## 2024-02-07 RX ORDER — SODIUM CHLORIDE, SODIUM LACTATE, POTASSIUM CHLORIDE, CALCIUM CHLORIDE 600; 310; 30; 20 MG/100ML; MG/100ML; MG/100ML; MG/100ML
125 INJECTION, SOLUTION INTRAVENOUS CONTINUOUS
Status: DISCONTINUED | OUTPATIENT
Start: 2024-02-07 | End: 2024-02-07 | Stop reason: HOSPADM

## 2024-02-07 RX ORDER — OSELTAMIVIR PHOSPHATE 75 MG/1
75 CAPSULE ORAL 2 TIMES DAILY
Qty: 6 CAPSULE | Refills: 0 | Status: SHIPPED | OUTPATIENT
Start: 2024-02-07 | End: 2024-02-10

## 2024-02-07 RX ORDER — IBUPROFEN 600 MG/1
600 TABLET ORAL EVERY 6 HOURS
Qty: 20 TABLET | Refills: 0 | Status: SHIPPED | OUTPATIENT
Start: 2024-02-07 | End: 2024-03-19 | Stop reason: WASHOUT

## 2024-02-07 RX ADMIN — GUAIFENESIN 600 MG: 600 TABLET ORAL at 01:48

## 2024-02-07 RX ADMIN — PHENOL 1 SPRAY: 1.5 LIQUID ORAL at 01:49

## 2024-02-07 RX ADMIN — SODIUM CHLORIDE, SODIUM LACTATE, POTASSIUM CHLORIDE, AND CALCIUM CHLORIDE 125 ML/HR: 600; 310; 30; 20 INJECTION, SOLUTION INTRAVENOUS at 01:51

## 2024-02-07 RX ADMIN — SODIUM CHLORIDE, SODIUM LACTATE, POTASSIUM CHLORIDE, AND CALCIUM CHLORIDE 500 ML: 600; 310; 30; 20 INJECTION, SOLUTION INTRAVENOUS at 01:18

## 2024-02-07 RX ADMIN — IBUPROFEN 600 MG: 600 TABLET, FILM COATED ORAL at 01:48

## 2024-02-07 ASSESSMENT — ACTIVITIES OF DAILY LIVING (ADL): LACK_OF_TRANSPORTATION: NO

## 2024-02-07 ASSESSMENT — PAIN SCALES - GENERAL: PAINLEVEL_OUTOF10: 8

## 2024-02-07 NOTE — PROGRESS NOTES
Postpartum Progress Note    Assessment/Plan   Veena Barone is a 28 y.o., , who delivered at 37w4d gestation and is now postpartum day 2.    Mild PreE s/p delivery, pih precautions w/bp cuff/log; early pp visit for bp check  Flu: send script  Ok to dc to home    Active Problems:    Influenza B    Premature rupture of membranes    Anemia of mother in pregnancy, delivered with postpartum condition    Pregnancy Problems (from 23 to present)       Problem Noted Resolved    Premature rupture of membranes 2024 by Michelle Apodaca,  No    Priority:  Medium            Hospital course: no complications  Vaginal Birth  Patient is not breastfeedingThe patient's blood type is B NEG. The baby's blood type is O POS . Rhogam indicated and given.    Subjective   Her pain is well controlled with current medications  She is passing flatus  She is ambulating well  She is tolerating a Adult diet Regular  She reports no breast or nursing problems  She denies emotional concerns today   Her plan for contraception is none         Objective   Allergies:   Patient has no known allergies.         Last Vitals:  Temp Pulse Resp BP MAP Pulse Ox   36.8 °C (98.2 °F) 77 17 136/82   100 %     Vitals Min/Max Last 24 Hours:  Temp  Min: 36.5 °C (97.7 °F)  Max: 36.8 °C (98.2 °F)  Pulse  Min: 77  Max: 88  Resp  Min: 17  Max: 18  BP  Min: 129/67  Max: 143/84    Intake/Output:     Intake/Output Summary (Last 24 hours) at 2024 0932  Last data filed at 2024 0500  Gross per 24 hour   Intake 990 ml   Output --   Net 990 ml       Physical Exam:  General: Examination reveals a well developed, well nourished, female, in no acute distress. She is alert and cooperative.  Abdomen: soft, gravid, nontender, nondistended, no abnormal masses, no epigastric pain.  Fundus: firm and nontender.  Extremities: no redness or tenderness in the calves or thighs, no edema.    Lab Data:  Labs in chart were reviewed.

## 2024-02-07 NOTE — DISCHARGE SUMMARY
Discharge Summary    Admission Date: 2/4/2024  Discharge Date: 2/7/2024    Discharge Diagnosis  Normal labor  Flu b  Mild PreE    Hospital Course  Delivery Date: 2/5/2024  1:26 AM   Delivery type: Vaginal, Spontaneous    GA at delivery: 37w4d  Outcome: Living   Anesthesia during delivery: Epidural   Intrapartum complications: None   Feeding method: Breastfeeding Status: No     Procedures: none  Contraception at discharge: discuss pp      Pertinent Physical Exam At Time of Discharge    General: Examination reveals a well developed, well nourished, female, in no acute distress. She is alert and cooperative.    Discharge Meds     Your medication list        START taking these medications        Instructions Last Dose Given Next Dose Due   ibuprofen 600 mg tablet      Take 1 tablet (600 mg) by mouth every 6 hours.       oseltamivir 75 mg capsule  Commonly known as: Tamiflu      Take 1 capsule (75 mg) by mouth 2 times a day for 3 days.              CONTINUE taking these medications        Instructions Last Dose Given Next Dose Due   Mini Prenatal 6.75 mg iron- 200 mcg tablet  Generic drug: prenatal vit 49-iron fum-folic           nitrofurantoin (macrocrystal-monohydrate) 100 mg capsule  Commonly known as: Macrobid      Take 1 capsule (100 mg) by mouth 2 times a day.       sertraline 100 mg tablet  Commonly known as: Zoloft      Take 1.5 tablets (150 mg) by mouth once daily.                 Where to Get Your Medications        These medications were sent to [id number] [place] [id number] [place] [id number]      Phone: [phone]   ibuprofen 600 mg tablet  oseltamivir 75 mg capsule          Complications Requiring Follow-Up  Mild PreE    Test Results Pending At Discharge  Pending Labs       No current pending labs.            Outpatient Follow-Up  Future Appointments   Date Time Provider Department Center   2/8/2024  3:45 PM Yesenia Alexandre MD [facility] [place]    2/15/2024  3:45 PM MD [facility] Petty   2/21/2024  3:30 PM MD [facility] Pet[facility]       I spent 20 minutes in the professional and overall care of this patient.      Elvira Martinez MD

## 2024-02-07 NOTE — PROGRESS NOTES
Patient c/o bilateral floaters in her vision this afternoon and evening. Afternoon BP normal. Evening shift BP slightly elevated. Normal neuro exam per nurse.   -Ordered labs to rule out PEC.

## 2024-02-07 NOTE — TELEPHONE ENCOUNTER
Pharm calling questioning dosage and sig of Tamiflu. States it is usually bid x5d. Please advise.

## 2024-02-08 ENCOUNTER — APPOINTMENT (OUTPATIENT)
Dept: OBSTETRICS AND GYNECOLOGY | Facility: CLINIC | Age: 29
End: 2024-02-08
Payer: COMMERCIAL

## 2024-02-12 ENCOUNTER — TELEPHONE (OUTPATIENT)
Dept: OBSTETRICS AND GYNECOLOGY | Facility: CLINIC | Age: 29
End: 2024-02-12
Payer: COMMERCIAL

## 2024-02-12 NOTE — TELEPHONE ENCOUNTER
Pt delivered  .  Pt calling to schedule her 1 week pp bp check.  She has been checking her bp at home, reading range from 140's/'s.  Pt is not currently on a bp medication.  Appt scheduled for tomorrow for bp check.  Pt also states she has been passing a lot of clots, biggest is the size of an egg, bleeding is not too heavy.

## 2024-02-13 ENCOUNTER — POSTPARTUM VISIT (OUTPATIENT)
Dept: OBSTETRICS AND GYNECOLOGY | Facility: CLINIC | Age: 29
End: 2024-02-13
Payer: COMMERCIAL

## 2024-02-13 ENCOUNTER — PREP FOR PROCEDURE (OUTPATIENT)
Dept: OPERATING ROOM | Facility: HOSPITAL | Age: 29
End: 2024-02-13
Payer: COMMERCIAL

## 2024-02-13 VITALS
WEIGHT: 203 LBS | SYSTOLIC BLOOD PRESSURE: 138 MMHG | BODY MASS INDEX: 38.33 KG/M2 | DIASTOLIC BLOOD PRESSURE: 86 MMHG | HEIGHT: 61 IN

## 2024-02-13 DIAGNOSIS — Z30.2 STERILIZATION: Primary | ICD-10-CM

## 2024-02-13 PROCEDURE — 0503F POSTPARTUM CARE VISIT: CPT | Performed by: OBSTETRICS & GYNECOLOGY

## 2024-02-13 RX ORDER — SODIUM CHLORIDE, SODIUM LACTATE, POTASSIUM CHLORIDE, CALCIUM CHLORIDE 600; 310; 30; 20 MG/100ML; MG/100ML; MG/100ML; MG/100ML
125 INJECTION, SOLUTION INTRAVENOUS CONTINUOUS
Status: CANCELLED | OUTPATIENT
Start: 2024-03-22

## 2024-02-13 ASSESSMENT — LIFESTYLE VARIABLES
HOW OFTEN DO YOU HAVE A DRINK CONTAINING ALCOHOL: NEVER
HOW MANY STANDARD DRINKS CONTAINING ALCOHOL DO YOU HAVE ON A TYPICAL DAY: PATIENT DOES NOT DRINK
AUDIT-C TOTAL SCORE: 0
HOW OFTEN DO YOU HAVE SIX OR MORE DRINKS ON ONE OCCASION: NEVER
SKIP TO QUESTIONS 9-10: 1

## 2024-02-13 ASSESSMENT — ENCOUNTER SYMPTOMS
LOSS OF SENSATION IN FEET: 0
OCCASIONAL FEELINGS OF UNSTEADINESS: 0
DEPRESSION: 0

## 2024-02-13 ASSESSMENT — PAIN SCALES - GENERAL: PAINLEVEL: 0-NO PAIN

## 2024-02-13 NOTE — PROGRESS NOTES
Subjective   Veena Barone is a 28 y.o. female who presents for a postpartum BP check.  She is 1 week postpartum. I have fully reviewed the prenatal and i  Review of Systems     Bp stable.  Reviewed BP logs.  Patient aware of parameters to call.  Reviewed PEC signs.    Diagnoses and all orders for this visit:  Postpartum exam  Follow up in: 5 weeks.  or as needed.   Tubal scheduled.  Procedure of laparoscopic TL including risks, benefits and alternatives were discussed.

## 2024-02-15 ENCOUNTER — APPOINTMENT (OUTPATIENT)
Dept: OBSTETRICS AND GYNECOLOGY | Facility: CLINIC | Age: 29
End: 2024-02-15
Payer: COMMERCIAL

## 2024-02-21 ENCOUNTER — APPOINTMENT (OUTPATIENT)
Dept: OBSTETRICS AND GYNECOLOGY | Facility: CLINIC | Age: 29
End: 2024-02-21
Payer: COMMERCIAL

## 2024-02-22 ENCOUNTER — PREP FOR PROCEDURE (OUTPATIENT)
Dept: OBSTETRICS AND GYNECOLOGY | Facility: HOSPITAL | Age: 29
End: 2024-02-22
Payer: COMMERCIAL

## 2024-03-08 ENCOUNTER — PRE-ADMISSION TESTING (OUTPATIENT)
Dept: PREADMISSION TESTING | Facility: HOSPITAL | Age: 29
End: 2024-03-08
Payer: COMMERCIAL

## 2024-03-08 VITALS
WEIGHT: 199 LBS | OXYGEN SATURATION: 99 % | RESPIRATION RATE: 16 BRPM | SYSTOLIC BLOOD PRESSURE: 132 MMHG | DIASTOLIC BLOOD PRESSURE: 88 MMHG | HEIGHT: 61 IN | BODY MASS INDEX: 37.57 KG/M2 | HEART RATE: 87 BPM | TEMPERATURE: 97.3 F

## 2024-03-08 DIAGNOSIS — Z30.2 STERILIZATION: ICD-10-CM

## 2024-03-08 LAB
ABO GROUP (TYPE) IN BLOOD: NORMAL
ANTIBODY SCREEN: NORMAL
BB ANTIBODY IDENTIFICATION: NORMAL
CASE #: NORMAL
ERYTHROCYTE [DISTWIDTH] IN BLOOD BY AUTOMATED COUNT: 12.4 % (ref 11.5–14.5)
HCT VFR BLD AUTO: 39.9 % (ref 36–46)
HGB BLD-MCNC: 13.3 G/DL (ref 12–16)
MCH RBC QN AUTO: 28.7 PG (ref 26–34)
MCHC RBC AUTO-ENTMCNC: 33.3 G/DL (ref 32–36)
MCV RBC AUTO: 86 FL (ref 80–100)
NRBC BLD-RTO: 0 /100 WBCS (ref 0–0)
PLATELET # BLD AUTO: 296 X10*3/UL (ref 150–450)
RBC # BLD AUTO: 4.63 X10*6/UL (ref 4–5.2)
RH FACTOR (ANTIGEN D): NORMAL
WBC # BLD AUTO: 8 X10*3/UL (ref 4.4–11.3)

## 2024-03-08 PROCEDURE — 99203 OFFICE O/P NEW LOW 30 MIN: CPT | Performed by: NURSE PRACTITIONER

## 2024-03-08 PROCEDURE — 86901 BLOOD TYPING SEROLOGIC RH(D): CPT

## 2024-03-08 PROCEDURE — 85027 COMPLETE CBC AUTOMATED: CPT

## 2024-03-08 PROCEDURE — 36415 COLL VENOUS BLD VENIPUNCTURE: CPT

## 2024-03-08 PROCEDURE — 86870 RBC ANTIBODY IDENTIFICATION: CPT

## 2024-03-08 ASSESSMENT — DUKE ACTIVITY SCORE INDEX (DASI)
CAN YOU DO MODERATE WORK AROUND THE HOUSE LIKE VACUUMING, SWEEPING FLOORS OR CARRYING GROCERIES: YES
CAN YOU HAVE SEXUAL RELATIONS: YES
CAN YOU PARTICIPATE IN MODERATE RECREATIONAL ACTIVITIES LIKE GOLF, BOWLING, DANCING, DOUBLES TENNIS OR THROWING A BASEBALL OR FOOTBALL: YES
CAN YOU WALK A BLOCK OR TWO ON LEVEL GROUND: YES
CAN YOU TAKE CARE OF YOURSELF (EAT, DRESS, BATHE, OR USE TOILET): YES
CAN YOU WALK INDOORS, SUCH AS AROUND YOUR HOUSE: YES
DASI METS SCORE: 9.9
CAN YOU PARTICIPATE IN STRENOUS SPORTS LIKE SWIMMING, SINGLES TENNIS, FOOTBALL, BASKETBALL, OR SKIING: YES
TOTAL_SCORE: 58.2
CAN YOU CLIMB A FLIGHT OF STAIRS OR WALK UP A HILL: YES
CAN YOU DO HEAVY WORK AROUND THE HOUSE LIKE SCRUBBING FLOORS OR LIFTING AND MOVING HEAVY FURNITURE: YES
CAN YOU DO YARD WORK LIKE RAKING LEAVES, WEEDING OR PUSHING A MOWER: YES
CAN YOU DO LIGHT WORK AROUND THE HOUSE LIKE DUSTING OR WASHING DISHES: YES
CAN YOU RUN A SHORT DISTANCE: YES

## 2024-03-08 ASSESSMENT — CHADS2 SCORE
CHF: NO
AGE GREATER THAN OR EQUAL TO 75: NO
DIABETES: NO
CHADS2 SCORE: 0
HYPERTENSION: NO
PRIOR STROKE OR TIA OR THROMBOEMBOLISM: NO

## 2024-03-08 ASSESSMENT — ENCOUNTER SYMPTOMS
HEMATOLOGIC/LYMPHATIC NEGATIVE: 1
NEUROLOGICAL NEGATIVE: 1
CONSTITUTIONAL NEGATIVE: 1
RESPIRATORY NEGATIVE: 1
PSYCHIATRIC NEGATIVE: 1
ENDOCRINE NEGATIVE: 1
EYES NEGATIVE: 1
CARDIOVASCULAR NEGATIVE: 1
GASTROINTESTINAL NEGATIVE: 1
MUSCULOSKELETAL NEGATIVE: 1

## 2024-03-08 ASSESSMENT — PAIN - FUNCTIONAL ASSESSMENT: PAIN_FUNCTIONAL_ASSESSMENT: 0-10

## 2024-03-08 ASSESSMENT — PAIN SCALES - GENERAL: PAINLEVEL_OUTOF10: 0 - NO PAIN

## 2024-03-08 NOTE — H&P (VIEW-ONLY)
CPM/PAT Evaluation       Name: Veena Barone (Veena Barone)  /Age: 1995/ y.o.     In-Person       Chief Complaint: encounter for sterilization    HPI    Pt is a 28 year old female who would like permanent sterilization. Pt does not wish to have anymore children. Pt stated she was supposed to have this surgery after the vaginal birth of her daughter on 2024 but she became ill with the flu. Pt reports that she feels well today. Pt has been scheduled for laparoscopic salpingectomy. Pt denies CP, SOB, or dizziness.     Past Medical History:   Diagnosis Date    Depression     Disorder of the skin and subcutaneous tissue, unspecified 2014    Skin lesion    Encounter for contraceptive management, unspecified 2015    Contraception management    Encounter for immunization 2014    Need for HPV vaccine    GERD (gastroesophageal reflux disease)     Hx of ovarian cyst     Migraine     Obesity in pregnancy     Personal history of other diseases of the digestive system 2014    History of constipation    Personal history of other diseases of the female genital tract 2014    History of vaginitis    Personal history of other diseases of the musculoskeletal system and connective tissue 2015    History of backache    Personal history of other specified conditions 2015    History of abdominal pain    Personal history of other specified conditions 2014    History of urinary frequency     No past surgical history on file.    Social History     Tobacco Use    Smoking status: Never    Smokeless tobacco: Never   Substance Use Topics    Alcohol use: Never     Social History     Substance and Sexual Activity   Drug Use Never     Patient  reports being sexually active and has had partner(s) who are male.    Family History   Problem Relation Name Age of Onset    Hypertension Mother      Kidney nephrosis Mother      No Known Problems Father      No Known Problems  "Sister      Diabetes Mother's Sister      Liver cancer Maternal Grandmother      Thyroid cancer Maternal Grandmother      Heart attack Maternal Grandfather      Diabetes Maternal Grandfather      Lung cancer Paternal Grandmother      Heart attack Paternal Grandfather          cause of death    Heart disease Paternal Grandfather      No Known Problems Daughter      Endometriosis Other         No Known Allergies    Current Outpatient Medications   Medication Sig Dispense Refill    ibuprofen 600 mg tablet Take 1 tablet (600 mg) by mouth every 6 hours. (Patient not taking: Reported on 3/8/2024) 20 tablet 0    prenatal vit 49-iron fum-folic (Mini Prenatal) 6.75 mg iron- 200 mcg tablet as directed Orally      sertraline (Zoloft) 100 mg tablet Take 1.5 tablets (150 mg) by mouth once daily. 135 tablet 3     No current facility-administered medications for this visit.      Review of Systems   Constitutional: Negative.    HENT: Negative.     Eyes: Negative.    Respiratory: Negative.     Cardiovascular: Negative.    Gastrointestinal: Negative.    Endocrine: Negative.    Genitourinary: Negative.    Musculoskeletal: Negative.    Skin: Negative.    Neurological: Negative.    Hematological: Negative.    Psychiatric/Behavioral: Negative.       /88   Pulse 87   Temp 36.3 °C (97.3 °F)   Resp 16   Ht 1.549 m (5' 1\")   Wt 90.3 kg (199 lb)   LMP  (LMP Unknown)   SpO2 99%   Breastfeeding No   BMI 37.60 kg/m²     Physical Exam  Vitals reviewed.   Constitutional:       Appearance: Normal appearance. She is obese.   HENT:      Head: Normocephalic and atraumatic.      Nose: Nose normal.      Mouth/Throat:      Mouth: Mucous membranes are moist.      Pharynx: Oropharynx is clear.   Eyes:      Extraocular Movements: Extraocular movements intact.      Conjunctiva/sclera: Conjunctivae normal.      Pupils: Pupils are equal, round, and reactive to light.   Cardiovascular:      Rate and Rhythm: Normal rate and regular rhythm.      " Pulses: Normal pulses.      Heart sounds: Normal heart sounds.   Pulmonary:      Effort: Pulmonary effort is normal.      Breath sounds: Normal breath sounds.   Abdominal:      General: Bowel sounds are normal.      Palpations: Abdomen is soft.      Tenderness: There is no abdominal tenderness.   Musculoskeletal:         General: Normal range of motion.      Cervical back: Normal range of motion and neck supple.   Skin:     General: Skin is warm and dry.   Neurological:      General: No focal deficit present.      Mental Status: She is alert and oriented to person, place, and time. Mental status is at baseline.   Psychiatric:         Mood and Affect: Mood normal.         Behavior: Behavior normal.         Thought Content: Thought content normal.         Judgment: Judgment normal.        PAT AIRWAY:   Airway:     Mallampati::  III    TM distance::  >3 FB    Neck ROM::  Full  normal      ASA: 2  DASI: 58.2  METS: 9.9  CHADS: 1.9%  RCRI:0.4%  STOP BAN    Assessment and Plan:     Sterilization: salpingectomy laparoscopy.  Delivered baby vaginally on  2023  BMI: 37.6  Influenza B positive on 2024.     CBC and T&S collected in [facility] today,    Lucía Avalos, APRN-CNP

## 2024-03-08 NOTE — CPM/PAT H&P
CPM/PAT Evaluation       Name: Veena Barone (Veena Barone)  /Age: 1995/ y.o.     In-Person       Chief Complaint: encounter for sterilization    HPI    Pt is a 28 year old female who would like permanent sterilization. Pt does not wish to have anymore children. Pt stated she was supposed to have this surgery after the vaginal birth of her daughter on 2024 but she became ill with the flu. Pt reports that she feels well today. Pt has been scheduled for laparoscopic salpingectomy. Pt denies CP, SOB, or dizziness.     Past Medical History:   Diagnosis Date    Depression     Disorder of the skin and subcutaneous tissue, unspecified 2014    Skin lesion    Encounter for contraceptive management, unspecified 2015    Contraception management    Encounter for immunization 2014    Need for HPV vaccine    GERD (gastroesophageal reflux disease)     Hx of ovarian cyst     Migraine     Obesity in pregnancy     Personal history of other diseases of the digestive system 2014    History of constipation    Personal history of other diseases of the female genital tract 2014    History of vaginitis    Personal history of other diseases of the musculoskeletal system and connective tissue 2015    History of backache    Personal history of other specified conditions 2015    History of abdominal pain    Personal history of other specified conditions 2014    History of urinary frequency     No past surgical history on file.    Social History     Tobacco Use    Smoking status: Never    Smokeless tobacco: Never   Substance Use Topics    Alcohol use: Never     Social History     Substance and Sexual Activity   Drug Use Never     Patient  reports being sexually active and has had partner(s) who are male.    Family History   Problem Relation Name Age of Onset    Hypertension Mother      Kidney nephrosis Mother      No Known Problems Father      No Known Problems  "Sister      Diabetes Mother's Sister      Liver cancer Maternal Grandmother      Thyroid cancer Maternal Grandmother      Heart attack Maternal Grandfather      Diabetes Maternal Grandfather      Lung cancer Paternal Grandmother      Heart attack Paternal Grandfather          cause of death    Heart disease Paternal Grandfather      No Known Problems Daughter      Endometriosis Other         No Known Allergies    Current Outpatient Medications   Medication Sig Dispense Refill    ibuprofen 600 mg tablet Take 1 tablet (600 mg) by mouth every 6 hours. (Patient not taking: Reported on 3/8/2024) 20 tablet 0    prenatal vit 49-iron fum-folic (Mini Prenatal) 6.75 mg iron- 200 mcg tablet as directed Orally      sertraline (Zoloft) 100 mg tablet Take 1.5 tablets (150 mg) by mouth once daily. 135 tablet 3     No current facility-administered medications for this visit.      Review of Systems   Constitutional: Negative.    HENT: Negative.     Eyes: Negative.    Respiratory: Negative.     Cardiovascular: Negative.    Gastrointestinal: Negative.    Endocrine: Negative.    Genitourinary: Negative.    Musculoskeletal: Negative.    Skin: Negative.    Neurological: Negative.    Hematological: Negative.    Psychiatric/Behavioral: Negative.       /88   Pulse 87   Temp 36.3 °C (97.3 °F)   Resp 16   Ht 1.549 m (5' 1\")   Wt 90.3 kg (199 lb)   LMP  (LMP Unknown)   SpO2 99%   Breastfeeding No   BMI 37.60 kg/m²     Physical Exam  Vitals reviewed.   Constitutional:       Appearance: Normal appearance. She is obese.   HENT:      Head: Normocephalic and atraumatic.      Nose: Nose normal.      Mouth/Throat:      Mouth: Mucous membranes are moist.      Pharynx: Oropharynx is clear.   Eyes:      Extraocular Movements: Extraocular movements intact.      Conjunctiva/sclera: Conjunctivae normal.      Pupils: Pupils are equal, round, and reactive to light.   Cardiovascular:      Rate and Rhythm: Normal rate and regular rhythm.      " Pulses: Normal pulses.      Heart sounds: Normal heart sounds.   Pulmonary:      Effort: Pulmonary effort is normal.      Breath sounds: Normal breath sounds.   Abdominal:      General: Bowel sounds are normal.      Palpations: Abdomen is soft.      Tenderness: There is no abdominal tenderness.   Musculoskeletal:         General: Normal range of motion.      Cervical back: Normal range of motion and neck supple.   Skin:     General: Skin is warm and dry.   Neurological:      General: No focal deficit present.      Mental Status: She is alert and oriented to person, place, and time. Mental status is at baseline.   Psychiatric:         Mood and Affect: Mood normal.         Behavior: Behavior normal.         Thought Content: Thought content normal.         Judgment: Judgment normal.        PAT AIRWAY:   Airway:     Mallampati::  III    TM distance::  >3 FB    Neck ROM::  Full  normal      ASA: 2  DASI: 58.2  METS: 9.9  CHADS: 1.9%  RCRI:0.4%  STOP BAN    Assessment and Plan:     Sterilization: salpingectomy laparoscopy.  Delivered baby vaginally on  2023  BMI: 37.6  Influenza B positive on 2024.     CBC and T&S collected in [facility] today,    Lucía Avalos, APRN-CNP

## 2024-03-08 NOTE — PREPROCEDURE INSTRUCTIONS
Why must I stop eating and drinking near surgery time?  With sedation, food or liquid in your stomach can enter your lungs causing serious complications  Increases nausea and vomiting    When do I need to stop eating and drinking before my surgery?   Do not eat or drink after midnight the night before your surgery/procedure.  You may have small sips of water to take your medication.        PAT DISCHARGE INSTRUCTIONS    Please call the Same Day Surgery (SDS) Department of the hospital where your procedure will be performed after 2:00 PM the day before your surgery. If you are scheduled on a Monday, or a Tuesday following a Monday holiday, you will need to call on the last business day prior to your surgery.      [address] [address] [id number] [place] [id number] [phone]      Please let your surgeon know if:      You develop any open sores, shingles, burning or painful urination as these may increase your risk of an infection.   You no longer wish to have the surgery.   Any other personal circumstances change that may lead to the need to cancel or defer this surgery-such as being sick or getting admitted to any hospital within one week of your planned procedure.    Your contact details change, such as a change of address or phone number.    Starting now:     Please DO NOT drink alcohol or smoke for 24 hours before surgery. It is well known that quitting smoking can make a huge difference to your health and recovery from surgery. The longer you abstain from smoking, the better your chances of a healthy recovery. If you need help with quitting, call 7-684-QUIT-NOW to be connected to a trained counselor who will discuss the best methods to help you quit.     Before your surgery:    Please stop all supplements 7 days prior to surgery. Or as directed by your surgeon.   Please stop taking NSAID pain medicine such as Advil and Motrin 7 days before surgery.    If you develop any  fever, cough, cold, rashes, cuts, scratches, scrapes, urinary symptoms or infection anywhere on your body (including teeth and gums) prior to surgery, please call your surgeon’s office as soon as possible. This may require treatment to reduce the chance of cancellation on the day of surgery.    The day before your surgery:   DIET- Do not eat or drink anything after midnight the night before surgery, including mints, candy and gum.   Get a good night’s rest.  Use the special soap for bathing if you have been instructed to use one.    Scheduled surgery times may change and you will be notified if this occurs - please check your personal voicemail for any updates.     On the morning of surgery:   Wear comfortable, loose fitting clothes which open in the front. Please do not wear moisturizers, creams, lotions, makeup or perfume.    Please bring with you to surgery:   Photo ID and insurance card   Current list of medicines and allergies   Pacemaker/ Defibrillator/Heart stent cards   CPAP machine and mask    Slings/ splints/ crutches   A copy of your complete advanced directive/DHPOA.    Please do NOT bring with you to surgery:   All jewelry and valuables should be left at home.   Prosthetic devices such as contact lenses, hearing aids, dentures, eyelash extensions, hairpins and body piercings must be removed prior to going in to the surgical suite.    After outpatient surgery:   A responsible adult MUST accompany you at the time of discharge and stay with you for 24 hours after your surgery. You may NOT drive yourself home after surgery.    Do not drive, operate machinery, make critical decisions or do activities that require co-ordination or balance until after a night’s sleep.   Do not drink alcoholic beverages for 24 hours.   Instructions for resuming your medications will be provided by your surgeon.    CALL YOUR DOCTOR AFTER SURGERY IF YOU HAVE:     Chills and/or a fever of 101° F or higher.    Redness, swelling,  pus or drainage from your surgical wound or a bad smell from the wound.    Lightheadedness, fainting or confusion.    Persistent vomiting (throwing up) and are not able to eat or drink for 12 hours.    Three or more loose, watery bowel movements in 24 hours (diarrhea).   Difficulty or pain while urinating( after non-urological surgery)    Pain and swelling in your legs, especially if it is only on one side.    Difficulty breathing or are breathing faster than normal.    Any new concerning symptoms.     Medication List            Accurate as of March 8, 2024 12:14 PM. Always use your most recent med list.                ibuprofen 600 mg tablet  Take 1 tablet (600 mg) by mouth every 6 hours.     Mini Prenatal 6.75 mg iron- 200 mcg tablet  Generic drug: prenatal vit 49-iron fum-folic     sertraline 100 mg tablet  Commonly known as: Zoloft  Take 1.5 tablets (150 mg) by mouth once daily.  Medication Adjustments for Surgery: Take morning of surgery with sip of water, no other fluids

## 2024-03-18 NOTE — PROGRESS NOTES
Gustavo Chen is a 28 y.o. female who presents for a postpartum visit. She is 6 week postpartum. I have fully reviewed the prenatal and intrapartum course.   The delivery was at term.  Type of delivery      Complications - none  Place of delivery - [facility]  Postpartum course has been    normal.   Baby's course has been    normal.   Baby is feeding by   bottle  Baby's name     Baby's weight   8#  Bleeding no bleeding.  Patient is not sexually active.   Contraception method is    BPS.   Postpartum depression screening: negative.    Review of Systems     Objective   LMP  (LMP Unknown)    General:  Well developed, well nourished in NAD, alert and oriented    Breasts:  ideferred                Abdomen: normal                 Perineum: normal         Pelvic support: normal    Vulva: normal   Vagina: normal vagina   Cervix:  no lesions   Corpus: normal   Adnexa:  normal adnexa   Rectal Exam: deferred     Assessment/Plan   Diagnoses and all orders for this visit:  Encounter for gynecological examination without abnormal finding     postpartum exam.     Follow up in: 1 year  or as needed.

## 2024-03-19 ENCOUNTER — POSTPARTUM VISIT (OUTPATIENT)
Dept: OBSTETRICS AND GYNECOLOGY | Facility: CLINIC | Age: 29
End: 2024-03-19
Payer: COMMERCIAL

## 2024-03-19 VITALS
BODY MASS INDEX: 38.33 KG/M2 | SYSTOLIC BLOOD PRESSURE: 134 MMHG | HEIGHT: 61 IN | WEIGHT: 203 LBS | DIASTOLIC BLOOD PRESSURE: 86 MMHG

## 2024-03-19 DIAGNOSIS — Z01.419 ENCOUNTER FOR GYNECOLOGICAL EXAMINATION WITHOUT ABNORMAL FINDING: Primary | ICD-10-CM

## 2024-03-19 PROCEDURE — 88141 CYTOPATH C/V INTERPRET: CPT | Performed by: PATHOLOGY

## 2024-03-19 PROCEDURE — 0503F POSTPARTUM CARE VISIT: CPT | Performed by: OBSTETRICS & GYNECOLOGY

## 2024-03-19 PROCEDURE — 88175 CYTOPATH C/V AUTO FLUID REDO: CPT | Mod: TC,GCY,WESLAB | Performed by: OBSTETRICS & GYNECOLOGY

## 2024-03-19 ASSESSMENT — EDINBURGH POSTNATAL DEPRESSION SCALE (EPDS)
TOTAL SCORE: 0
I HAVE LOOKED FORWARD WITH ENJOYMENT TO THINGS: AS MUCH AS I EVER DID
I HAVE BEEN SO UNHAPPY THAT I HAVE BEEN CRYING: NO, NEVER
I HAVE BEEN SO UNHAPPY THAT I HAVE HAD DIFFICULTY SLEEPING: NOT AT ALL
I HAVE BEEN ABLE TO LAUGH AND SEE THE FUNNY SIDE OF THINGS: AS MUCH AS I ALWAYS COULD
I HAVE FELT SCARED OR PANICKY FOR NO GOOD REASON: NO, NOT AT ALL
I HAVE FELT SAD OR MISERABLE: NO, NOT AT ALL
THINGS HAVE BEEN GETTING ON TOP OF ME: NO, I HAVE BEEN COPING AS WELL AS EVER
THE THOUGHT OF HARMING MYSELF HAS OCCURRED TO ME: NEVER
I HAVE BEEN ANXIOUS OR WORRIED FOR NO GOOD REASON: NO, NOT AT ALL
I HAVE BLAMED MYSELF UNNECESSARILY WHEN THINGS WENT WRONG: NO, NEVER

## 2024-03-19 ASSESSMENT — ENCOUNTER SYMPTOMS
LOSS OF SENSATION IN FEET: 0
DEPRESSION: 0
OCCASIONAL FEELINGS OF UNSTEADINESS: 0

## 2024-03-19 ASSESSMENT — PAIN SCALES - GENERAL: PAINLEVEL: 0-NO PAIN

## 2024-03-22 ENCOUNTER — PHARMACY VISIT (OUTPATIENT)
Dept: PHARMACY | Facility: CLINIC | Age: 29
End: 2024-03-22
Payer: COMMERCIAL

## 2024-03-22 ENCOUNTER — ANESTHESIA EVENT (OUTPATIENT)
Dept: OPERATING ROOM | Facility: HOSPITAL | Age: 29
End: 2024-03-22
Payer: COMMERCIAL

## 2024-03-22 ENCOUNTER — HOSPITAL ENCOUNTER (OUTPATIENT)
Facility: HOSPITAL | Age: 29
Setting detail: OUTPATIENT SURGERY
Discharge: HOME | End: 2024-03-22
Attending: OBSTETRICS & GYNECOLOGY | Admitting: OBSTETRICS & GYNECOLOGY
Payer: COMMERCIAL

## 2024-03-22 ENCOUNTER — ANESTHESIA (OUTPATIENT)
Dept: OPERATING ROOM | Facility: HOSPITAL | Age: 29
End: 2024-03-22
Payer: COMMERCIAL

## 2024-03-22 VITALS
DIASTOLIC BLOOD PRESSURE: 72 MMHG | TEMPERATURE: 97.2 F | OXYGEN SATURATION: 96 % | HEART RATE: 80 BPM | SYSTOLIC BLOOD PRESSURE: 117 MMHG | RESPIRATION RATE: 20 BRPM

## 2024-03-22 DIAGNOSIS — G89.18 POST-OP PAIN: ICD-10-CM

## 2024-03-22 DIAGNOSIS — Z30.2 STERILIZATION: Primary | ICD-10-CM

## 2024-03-22 LAB
ABO GROUP (TYPE) IN BLOOD: NORMAL
ANTIBODY SCREEN: NORMAL
BB ANTIBODY IDENTIFICATION: NORMAL
CASE #: NORMAL
PREGNANCY TEST URINE, POC: NEGATIVE
RH FACTOR (ANTIGEN D): NORMAL

## 2024-03-22 PROCEDURE — 2500000004 HC RX 250 GENERAL PHARMACY W/ HCPCS (ALT 636 FOR OP/ED): Performed by: ANESTHESIOLOGY

## 2024-03-22 PROCEDURE — 86870 RBC ANTIBODY IDENTIFICATION: CPT

## 2024-03-22 PROCEDURE — 2500000004 HC RX 250 GENERAL PHARMACY W/ HCPCS (ALT 636 FOR OP/ED): Performed by: OBSTETRICS & GYNECOLOGY

## 2024-03-22 PROCEDURE — 3600000003 HC OR TIME - INITIAL BASE CHARGE - PROCEDURE LEVEL THREE: Performed by: OBSTETRICS & GYNECOLOGY

## 2024-03-22 PROCEDURE — 7100000010 HC PHASE TWO TIME - EACH INCREMENTAL 1 MINUTE: Performed by: OBSTETRICS & GYNECOLOGY

## 2024-03-22 PROCEDURE — 2500000004 HC RX 250 GENERAL PHARMACY W/ HCPCS (ALT 636 FOR OP/ED): Performed by: NURSE ANESTHETIST, CERTIFIED REGISTERED

## 2024-03-22 PROCEDURE — 2720000007 HC OR 272 NO HCPCS: Performed by: OBSTETRICS & GYNECOLOGY

## 2024-03-22 PROCEDURE — 7100000002 HC RECOVERY ROOM TIME - EACH INCREMENTAL 1 MINUTE: Performed by: OBSTETRICS & GYNECOLOGY

## 2024-03-22 PROCEDURE — 88302 TISSUE EXAM BY PATHOLOGIST: CPT | Mod: TC | Performed by: OBSTETRICS & GYNECOLOGY

## 2024-03-22 PROCEDURE — A58661 PR LAP,RMV  ADNEXAL STRUCTURE: Performed by: ANESTHESIOLOGY

## 2024-03-22 PROCEDURE — 3600000008 HC OR TIME - EACH INCREMENTAL 1 MINUTE - PROCEDURE LEVEL THREE: Performed by: OBSTETRICS & GYNECOLOGY

## 2024-03-22 PROCEDURE — 86901 BLOOD TYPING SEROLOGIC RH(D): CPT | Performed by: OBSTETRICS & GYNECOLOGY

## 2024-03-22 PROCEDURE — RXMED WILLOW AMBULATORY MEDICATION CHARGE

## 2024-03-22 PROCEDURE — 88302 TISSUE EXAM BY PATHOLOGIST: CPT | Performed by: PATHOLOGY

## 2024-03-22 PROCEDURE — 58661 LAPAROSCOPY REMOVE ADNEXA: CPT | Performed by: OBSTETRICS & GYNECOLOGY

## 2024-03-22 PROCEDURE — 2500000005 HC RX 250 GENERAL PHARMACY W/O HCPCS: Performed by: NURSE ANESTHETIST, CERTIFIED REGISTERED

## 2024-03-22 PROCEDURE — 3700000001 HC GENERAL ANESTHESIA TIME - INITIAL BASE CHARGE: Performed by: OBSTETRICS & GYNECOLOGY

## 2024-03-22 PROCEDURE — 7100000001 HC RECOVERY ROOM TIME - INITIAL BASE CHARGE: Performed by: OBSTETRICS & GYNECOLOGY

## 2024-03-22 PROCEDURE — 36415 COLL VENOUS BLD VENIPUNCTURE: CPT | Performed by: OBSTETRICS & GYNECOLOGY

## 2024-03-22 PROCEDURE — A58661 PR LAP,RMV  ADNEXAL STRUCTURE: Performed by: NURSE ANESTHETIST, CERTIFIED REGISTERED

## 2024-03-22 PROCEDURE — 3700000002 HC GENERAL ANESTHESIA TIME - EACH INCREMENTAL 1 MINUTE: Performed by: OBSTETRICS & GYNECOLOGY

## 2024-03-22 PROCEDURE — 81025 URINE PREGNANCY TEST: CPT | Performed by: OBSTETRICS & GYNECOLOGY

## 2024-03-22 PROCEDURE — 7100000009 HC PHASE TWO TIME - INITIAL BASE CHARGE: Performed by: OBSTETRICS & GYNECOLOGY

## 2024-03-22 RX ORDER — FENTANYL CITRATE 50 UG/ML
50 INJECTION, SOLUTION INTRAMUSCULAR; INTRAVENOUS EVERY 5 MIN PRN
Status: DISCONTINUED | OUTPATIENT
Start: 2024-03-22 | End: 2024-03-22 | Stop reason: HOSPADM

## 2024-03-22 RX ORDER — ONDANSETRON HYDROCHLORIDE 2 MG/ML
INJECTION, SOLUTION INTRAVENOUS AS NEEDED
Status: DISCONTINUED | OUTPATIENT
Start: 2024-03-22 | End: 2024-03-22

## 2024-03-22 RX ORDER — PROPOFOL 10 MG/ML
INJECTION, EMULSION INTRAVENOUS AS NEEDED
Status: DISCONTINUED | OUTPATIENT
Start: 2024-03-22 | End: 2024-03-22

## 2024-03-22 RX ORDER — ALBUTEROL SULFATE 0.83 MG/ML
2.5 SOLUTION RESPIRATORY (INHALATION) ONCE
Status: DISCONTINUED | OUTPATIENT
Start: 2024-03-22 | End: 2024-03-22 | Stop reason: HOSPADM

## 2024-03-22 RX ORDER — MEPERIDINE HYDROCHLORIDE 25 MG/ML
12.5 INJECTION INTRAMUSCULAR; INTRAVENOUS; SUBCUTANEOUS EVERY 10 MIN PRN
Status: DISCONTINUED | OUTPATIENT
Start: 2024-03-22 | End: 2024-03-22 | Stop reason: HOSPADM

## 2024-03-22 RX ORDER — ROCURONIUM BROMIDE 10 MG/ML
INJECTION, SOLUTION INTRAVENOUS AS NEEDED
Status: DISCONTINUED | OUTPATIENT
Start: 2024-03-22 | End: 2024-03-22

## 2024-03-22 RX ORDER — SODIUM CHLORIDE, SODIUM LACTATE, POTASSIUM CHLORIDE, CALCIUM CHLORIDE 600; 310; 30; 20 MG/100ML; MG/100ML; MG/100ML; MG/100ML
125 INJECTION, SOLUTION INTRAVENOUS CONTINUOUS
Status: DISCONTINUED | OUTPATIENT
Start: 2024-03-22 | End: 2024-03-22 | Stop reason: HOSPADM

## 2024-03-22 RX ORDER — FENTANYL CITRATE 50 UG/ML
INJECTION, SOLUTION INTRAMUSCULAR; INTRAVENOUS AS NEEDED
Status: DISCONTINUED | OUTPATIENT
Start: 2024-03-22 | End: 2024-03-22

## 2024-03-22 RX ORDER — MIDAZOLAM HYDROCHLORIDE 1 MG/ML
1 INJECTION, SOLUTION INTRAMUSCULAR; INTRAVENOUS ONCE AS NEEDED
Status: DISCONTINUED | OUTPATIENT
Start: 2024-03-22 | End: 2024-03-22 | Stop reason: HOSPADM

## 2024-03-22 RX ORDER — KETOROLAC TROMETHAMINE 30 MG/ML
INJECTION, SOLUTION INTRAMUSCULAR; INTRAVENOUS AS NEEDED
Status: DISCONTINUED | OUTPATIENT
Start: 2024-03-22 | End: 2024-03-22

## 2024-03-22 RX ORDER — MIDAZOLAM HYDROCHLORIDE 1 MG/ML
INJECTION, SOLUTION INTRAMUSCULAR; INTRAVENOUS AS NEEDED
Status: DISCONTINUED | OUTPATIENT
Start: 2024-03-22 | End: 2024-03-22

## 2024-03-22 RX ORDER — LIDOCAINE HYDROCHLORIDE 10 MG/ML
INJECTION INFILTRATION; PERINEURAL AS NEEDED
Status: DISCONTINUED | OUTPATIENT
Start: 2024-03-22 | End: 2024-03-22

## 2024-03-22 RX ORDER — ONDANSETRON HYDROCHLORIDE 2 MG/ML
4 INJECTION, SOLUTION INTRAVENOUS ONCE AS NEEDED
Status: DISCONTINUED | OUTPATIENT
Start: 2024-03-22 | End: 2024-03-22 | Stop reason: HOSPADM

## 2024-03-22 RX ORDER — SODIUM CHLORIDE, SODIUM LACTATE, POTASSIUM CHLORIDE, CALCIUM CHLORIDE 600; 310; 30; 20 MG/100ML; MG/100ML; MG/100ML; MG/100ML
100 INJECTION, SOLUTION INTRAVENOUS CONTINUOUS
Status: DISCONTINUED | OUTPATIENT
Start: 2024-03-22 | End: 2024-03-22 | Stop reason: HOSPADM

## 2024-03-22 RX ORDER — LIDOCAINE HYDROCHLORIDE 10 MG/ML
0.1 INJECTION INFILTRATION; PERINEURAL ONCE
Status: DISCONTINUED | OUTPATIENT
Start: 2024-03-22 | End: 2024-03-22 | Stop reason: HOSPADM

## 2024-03-22 RX ORDER — GLYCOPYRROLATE 0.2 MG/ML
INJECTION INTRAMUSCULAR; INTRAVENOUS AS NEEDED
Status: DISCONTINUED | OUTPATIENT
Start: 2024-03-22 | End: 2024-03-22

## 2024-03-22 RX ORDER — OXYCODONE AND ACETAMINOPHEN 5; 325 MG/1; MG/1
1 TABLET ORAL EVERY 4 HOURS PRN
Qty: 20 TABLET | Refills: 0 | Status: SHIPPED | OUTPATIENT
Start: 2024-03-22 | End: 2024-03-27

## 2024-03-22 RX ADMIN — ROCURONIUM BROMIDE 10 MG: 10 INJECTION, SOLUTION INTRAVENOUS at 09:55

## 2024-03-22 RX ADMIN — FENTANYL CITRATE 50 MCG: 0.05 INJECTION, SOLUTION INTRAMUSCULAR; INTRAVENOUS at 09:38

## 2024-03-22 RX ADMIN — LIDOCAINE HYDROCHLORIDE 50 MG: 10 INJECTION, SOLUTION INFILTRATION; PERINEURAL at 09:38

## 2024-03-22 RX ADMIN — FENTANYL CITRATE 50 MCG: 0.05 INJECTION, SOLUTION INTRAMUSCULAR; INTRAVENOUS at 09:45

## 2024-03-22 RX ADMIN — MIDAZOLAM HYDROCHLORIDE 2 MG: 1 INJECTION, SOLUTION INTRAMUSCULAR; INTRAVENOUS at 09:32

## 2024-03-22 RX ADMIN — FENTANYL CITRATE 50 MCG: 50 INJECTION INTRAMUSCULAR; INTRAVENOUS at 10:37

## 2024-03-22 RX ADMIN — SUGAMMADEX 200 MG: 100 INJECTION, SOLUTION INTRAVENOUS at 10:17

## 2024-03-22 RX ADMIN — HYDROMORPHONE HYDROCHLORIDE 0.2 MG: 0.2 INJECTION, SOLUTION INTRAMUSCULAR; INTRAVENOUS; SUBCUTANEOUS at 10:59

## 2024-03-22 RX ADMIN — DEXAMETHASONE SODIUM PHOSPHATE 8 MG: 4 INJECTION INTRA-ARTICULAR; INTRALESIONAL; INTRAMUSCULAR; INTRAVENOUS; SOFT TISSUE at 09:45

## 2024-03-22 RX ADMIN — PROPOFOL 200 MG: 10 INJECTION, EMULSION INTRAVENOUS at 09:38

## 2024-03-22 RX ADMIN — GLYCOPYRROLATE 0.2 MG: 0.2 INJECTION INTRAMUSCULAR; INTRAVENOUS at 10:03

## 2024-03-22 RX ADMIN — KETOROLAC TROMETHAMINE 30 MG: 30 INJECTION, SOLUTION INTRAMUSCULAR at 10:13

## 2024-03-22 RX ADMIN — ROCURONIUM BROMIDE 30 MG: 10 INJECTION, SOLUTION INTRAVENOUS at 09:38

## 2024-03-22 RX ADMIN — SODIUM CHLORIDE, POTASSIUM CHLORIDE, SODIUM LACTATE AND CALCIUM CHLORIDE: 600; 310; 30; 20 INJECTION, SOLUTION INTRAVENOUS at 10:29

## 2024-03-22 RX ADMIN — ONDANSETRON HYDROCHLORIDE 4 MG: 2 INJECTION INTRAMUSCULAR; INTRAVENOUS at 10:09

## 2024-03-22 RX ADMIN — SODIUM CHLORIDE, POTASSIUM CHLORIDE, SODIUM LACTATE AND CALCIUM CHLORIDE: 600; 310; 30; 20 INJECTION, SOLUTION INTRAVENOUS at 09:30

## 2024-03-22 ASSESSMENT — PAIN SCALES - GENERAL
PAINLEVEL_OUTOF10: 4
PAINLEVEL_OUTOF10: 0 - NO PAIN
PAINLEVEL_OUTOF10: 8
PAINLEVEL_OUTOF10: 3
PAINLEVEL_OUTOF10: 8
PAINLEVEL_OUTOF10: 0 - NO PAIN
PAINLEVEL_OUTOF10: 6
PAINLEVEL_OUTOF10: 0 - NO PAIN

## 2024-03-22 ASSESSMENT — PAIN - FUNCTIONAL ASSESSMENT
PAIN_FUNCTIONAL_ASSESSMENT: 0-10

## 2024-03-22 ASSESSMENT — PAIN DESCRIPTION - DESCRIPTORS
DESCRIPTORS: SORE
DESCRIPTORS: CRAMPING

## 2024-03-22 ASSESSMENT — COLUMBIA-SUICIDE SEVERITY RATING SCALE - C-SSRS
6. HAVE YOU EVER DONE ANYTHING, STARTED TO DO ANYTHING, OR PREPARED TO DO ANYTHING TO END YOUR LIFE?: NO
1. IN THE PAST MONTH, HAVE YOU WISHED YOU WERE DEAD OR WISHED YOU COULD GO TO SLEEP AND NOT WAKE UP?: NO
2. HAVE YOU ACTUALLY HAD ANY THOUGHTS OF KILLING YOURSELF?: NO

## 2024-03-22 NOTE — ANESTHESIA POSTPROCEDURE EVALUATION
Patient: Veena Barone    Procedure Summary       Date: 03/22/24 Room / Location: [facility] 06 / [facility]    Anesthesia Start: 0930 Anesthesia Stop: 1030    Procedure: Salpingectomy Laparascopy (Bilateral: Abdomen) Diagnosis:       Sterilization      (Sterilization [Z30.2])    Surgeons: Yesenia Alexandre MD Responsible Provider: Valerio Isbell DO    Anesthesia Type: general ASA Status: 2            Anesthesia Type: general    Vitals Value Taken Time   /78 03/22/24 1045   Temp  03/22/24 1051   Pulse 85 03/22/24 1049   Resp 13 03/22/24 1049   SpO2 93 % 03/22/24 1049   Vitals shown include unvalidated device data.    Anesthesia Post Evaluation    Patient location during evaluation: bedside  Patient participation: complete - patient participated  Level of consciousness: awake  Pain management: adequate  Airway patency: patent  Cardiovascular status: acceptable  Respiratory status: acceptable  Hydration status: acceptable  Postoperative Nausea and Vomiting: none        There were no known notable events for this encounter.

## 2024-03-22 NOTE — OP NOTE
Salpingectomy Laparascopy (B) Operative Note     Date: 3/22/2024  OR Location: [facility]    Name: Veena Barone : 1995, Age: 28 y.o., MRN: [id number], Sex: female    Diagnosis  Pre-op Diagnosis     * Sterilization [Z30.2] Post-op Diagnosis     * Sterilization [Z30.2]     Procedures  Salpingectomy Laparascopy  [address] - [place] LAPAROSCOPY W/RMVL ADNEXAL STRUCTURES      Surgeons      * Yesenia Alexandre - Primary    Resident/Fellow/Other Assistant:  Surgeon(s) and Role:     * Elvira Martinez MD - Assisting    Procedure Summary  Anesthesia: Consult  ASA: II  Anesthesia Staff: Anesthesiologist: Valerio Isbell DO  CRNA: TIRSO Collado-CRNA  Estimated Blood Loss: <25mL  Intra-op Medications:   Administrations occurring from 0845 to 1015 on 24:   Medication Name Total Dose   lactated Ringer's infusion Cannot be calculated              Anesthesia Record               Intraprocedure I/O Totals          Output    Urine 200 mL    NG/OG Tube Output 50 mL    Total Output 250 mL          Specimen:   ID Type Source Tests Collected by Time   1 : bilateral fallopian tubes Tissue FALLOPIAN TUBE SALPINGECTOMY LEFT AND RIGHT SURGICAL PATHOLOGY EXAM Yesenia Alexandre MD 3/22/2024 1014        Staff:   Circulator: Lila Dotson RN         Drains and/or Catheters:   [REMOVED] NG/OG/Feeding Tube OG - Lily Dale sump 16 Fr (Removed)       Tourniquet Times:         Implants:     Findings: normal pelvic anatomy    Indications: Veena Barone is an 28 y.o. female who is having surgery for Sterilization [Z30.2].     The patient was seen in the preoperative area. The risks, benefits, complications, treatment options, non-operative alternatives, expected recovery and outcomes were discussed with the patient. The possibilities of reaction to medication, pulmonary aspiration, injury to surrounding structures, bleeding, recurrent infection, the need for additional procedures, failure to diagnose a condition, and creating a complication  requiring transfusion or operation were discussed with the patient. The patient concurred with the proposed plan, giving informed consent.  The site of surgery was properly noted/marked if necessary per policy. The patient has been actively warmed in preoperative area. Preoperative antibiotics are not indicated. Venous thrombosis prophylaxis are not indicated.    Procedure Details:   The patient was taken to the operating room and after adequate anesthesia was given the patient was placed in dorsal lithotomy position.  Examination under anesthesia was performed which revealed a small, mobile, anteverted uterus with no significant adnexal pathology palpable.  The patient's abdomen vulva, vagina,.  and inner aspects of the thighs were prepped and draped in usual sterile manner.  A Lee catheter was inserted under sterile conditions as well.  A weighted speculum was placed in the posterior fornix of the vagina and a single-tooth tenaculum was used to grasp the anterior lip of the cervix.  The uterine manipulator was then placed and the speculum was then removed.  My attention was then directed to the patient's abdomen where an infraumbilical incision was made with sharp dissection.  A Veress needle was then inserted, and intra-abdominal application was confirmed with a drop of saline in the hub of the Veress needle that preceded easily upon lifting the abdomen.  The abdomen was then insufflated to approximately 2 liters of CO2.  At this point the Veress needle was then removed and a 5 millimeter trocar and sheath were placed without difficulty.  Laparoscopic was inserted with visualization of the pelvis.  Under direct vision a 5 millimeter trocar and sheath were placed out to the left lateral abdomen.  A right lateral incision was then placed which allowed an 8 millimeter trocar to be placed at this site under direct vision.  The patient's left tube was then grasped with an atraumatic grasper and using the gyrus clamp  the tube was removed in a stepwise fashion by undermining it with the gyrus clamp.  The mesosalpinx was clamped cauterized and cut with the tube was then dissected off the uterus.  The tube was then removed.  A similar procedure was then carried out on the patient's right side where again the right tube was grasped with an atraumatic grasper.  The tube was undermined using the gyrus clamp by 1st clamping cauterizing cutting in a stepwise fashion until the entire tube was removed.  The tube was then removed.  The pelvis was well examined and all pedicles were noted to be hemostatic.  All instruments were then removed and the CO2 was expelled.  The skin incisions were closed with 4 Monocryl suture in a subcuticular manner.  The pelvic instruments were then removed.  The patient tolerated the procedure very well and was taken to the recovery room in overall satisfactory condition.   Complications:  None; patient tolerated the procedure well.    Disposition: PACU - hemodynamically stable.  Condition: stable       Assistant for Procedure:    Assistant:  Juan    Due to the complexity of the surgical case an assistant surgeon was necessary to complete the case.  During the case Dr. Martinez, participated though out the entirety of case.   Additional Details:     Attending Attestation:     Yesenia Alexandre  Phone Number: [phone]

## 2024-03-22 NOTE — ANESTHESIA PREPROCEDURE EVALUATION
Patient: Veena Barone    Procedure Information       Date/Time: 03/22/24 0845    Procedure: Salpingectomy Laparascopy (Bilateral)    Location: [facility] / [facility]    Surgeons: Yesenia Alexandre MD            Relevant Problems   Anesthesia (within normal limits)      Cardiovascular   (+) Breast pain, left      GI   (+) Gastroesophageal reflux disease   (+) Irritable bowel syndrome      /Renal   (+) Pyelonephritis      Neuro/Psych   (+) Anxiety   (+) Major depressive disorder, single episode, unspecified      Hematology   (+) Anemia of mother in pregnancy, delivered with postpartum condition      Infectious Disease   (+) Influenza B   (+) Pyelonephritis   (+) Yeast vaginitis       Clinical information reviewed:   Tobacco  Allergies  Meds  Problems  Med Hx  Surg Hx  OB Status    Fam Hx  Soc Hx        NPO Detail:  NPO/Void Status  Date of Last Liquid: 03/21/24  Time of Last Liquid: 2100  Date of Last Solid: 03/21/24  Time of Last Solid: 2100  Time of Last Void: 0732         Physical Exam    Airway  TM distance: >3 FB     Cardiovascular    Dental - normal exam     Pulmonary    Abdominal            Anesthesia Plan    History of general anesthesia?: yes  History of complications of general anesthesia?: no    ASA 2     general     intravenous induction   Anesthetic plan and risks discussed with patient.

## 2024-03-22 NOTE — ANESTHESIA PROCEDURE NOTES
Airway  Date/Time: 3/22/2024 9:43 AM  Urgency: elective    Airway not difficult    Staffing  Performed: CRNA   Authorized by: Valerio Isbell DO    Performed by: TIRSO Collado-CRNA  Patient location during procedure: OR    Indications and Patient Condition  Indications for airway management: anesthesia  Spontaneous Ventilation: absent  Sedation level: deep  Preoxygenated: yes  Patient position: sniffing  MILS maintained throughout  Mask difficulty assessment: 1 - vent by mask    Final Airway Details  Final airway type: endotracheal airway      Successful airway: ETT  Cuffed: yes   Successful intubation technique: direct laryngoscopy  Facilitating devices/methods: intubating stylet  Endotracheal tube insertion site: oral  Blade: Johan  Blade size: #3  ETT size (mm): 7.0  Cormack-Lehane Classification: grade I - full view of glottis  Placement verified by: chest auscultation and capnometry   Measured from: teeth  ETT to teeth (cm): 21  Number of attempts at approach: 1

## 2024-03-26 LAB
LABORATORY COMMENT REPORT: NORMAL
PATH REPORT.FINAL DX SPEC: NORMAL
PATH REPORT.GROSS SPEC: NORMAL
PATH REPORT.RELEVANT HX SPEC: NORMAL
PATH REPORT.TOTAL CANCER: NORMAL

## 2024-03-27 ENCOUNTER — TELEPHONE (OUTPATIENT)
Dept: OBSTETRICS AND GYNECOLOGY | Facility: CLINIC | Age: 29
End: 2024-03-27
Payer: COMMERCIAL

## 2024-03-27 NOTE — TELEPHONE ENCOUNTER
Pt had TL 03/22 was doing well until she slipped and fell on the ice a few days ago.  Pt c/o lower abdomen/vaginal pain; pain with coughing, laughing, and with any movement.  Stopped taking her pain meds due to constipation and pt states no improvement with ibuprofen

## 2024-03-27 NOTE — TELEPHONE ENCOUNTER
Can alternate Ibuprofen with Tylenol or can restart her pain meds if needed.  She may have a muscle strain.  Heating pad may help.  Can use Miralax or Colace for constipation but if no improvement then can come in to be seen.

## 2024-04-01 LAB
CYTOLOGY CMNT CVX/VAG CYTO-IMP: NORMAL
LAB AP HPV GENOTYPE QUESTION: YES
LAB AP HPV HR: NORMAL
LABORATORY COMMENT REPORT: NORMAL
PATH REPORT.TOTAL CANCER: NORMAL

## 2024-04-02 NOTE — PROGRESS NOTES
Subjective     Veena Barone is a 28 y.o. female who presents to the clinic 2 weeks postop  Procedure laparoscopic tubal.   Pathology: benign  Eating a regular diet without difficulty.   Bowel movements are normal.   The patient is having pain on the right side after she fell 2 days postop.      Objective   LMP  (LMP Unknown)   General:  alert and oriented, in no acute distress   Abdomen: soft, bowel sounds active, non-tender, area of concern to patient may be more muskuloskeletal   Incision:   healing well, no drainage, no erythema, 2no seroma, no swelling, no dehiscence, incision well approximated     Vulva: Normal architecture without erythema, masses, or lesions.    Vagina: Normal mucosa without lesions, masses, or atrophy. No abnormal vaginal discharge.    Cervix: Normal without masses, lesions, or signs of cervicitis   Uterus: Normal, mobile, non-enlarged uterus   Adnexa: Normal without masses or lesions       Assessment/Plan    Diagnoses and all orders for this visit:  Postop check    Doing well postoperatively.  Operative findings again reviewed. Pathology report discussed.    1. Continue any current medications.  2. Activity restrictions: none  3. Follow up: 1 or prn

## 2024-04-04 ENCOUNTER — OFFICE VISIT (OUTPATIENT)
Dept: OBSTETRICS AND GYNECOLOGY | Facility: CLINIC | Age: 29
End: 2024-04-04
Payer: COMMERCIAL

## 2024-04-04 VITALS
SYSTOLIC BLOOD PRESSURE: 128 MMHG | DIASTOLIC BLOOD PRESSURE: 70 MMHG | WEIGHT: 205 LBS | HEIGHT: 61 IN | BODY MASS INDEX: 38.71 KG/M2

## 2024-04-04 DIAGNOSIS — Z09 POSTOP CHECK: Primary | ICD-10-CM

## 2024-04-04 PROCEDURE — 99024 POSTOP FOLLOW-UP VISIT: CPT | Performed by: OBSTETRICS & GYNECOLOGY

## 2024-04-04 PROCEDURE — 1036F TOBACCO NON-USER: CPT | Performed by: OBSTETRICS & GYNECOLOGY

## 2024-04-04 ASSESSMENT — LIFESTYLE VARIABLES
HOW OFTEN DO YOU HAVE SIX OR MORE DRINKS ON ONE OCCASION: NEVER
SKIP TO QUESTIONS 9-10: 1
HOW OFTEN DO YOU HAVE A DRINK CONTAINING ALCOHOL: NEVER
HOW MANY STANDARD DRINKS CONTAINING ALCOHOL DO YOU HAVE ON A TYPICAL DAY: PATIENT DOES NOT DRINK
AUDIT-C TOTAL SCORE: 0

## 2024-04-04 ASSESSMENT — PATIENT HEALTH QUESTIONNAIRE - PHQ9
2. FEELING DOWN, DEPRESSED OR HOPELESS: NOT AT ALL
1. LITTLE INTEREST OR PLEASURE IN DOING THINGS: NOT AT ALL
SUM OF ALL RESPONSES TO PHQ9 QUESTIONS 1 & 2: 0

## 2024-04-04 ASSESSMENT — ENCOUNTER SYMPTOMS
OCCASIONAL FEELINGS OF UNSTEADINESS: 0
LOSS OF SENSATION IN FEET: 0
DEPRESSION: 0

## 2024-04-04 ASSESSMENT — PAIN SCALES - GENERAL: PAINLEVEL: 8

## 2025-01-12 ENCOUNTER — HOSPITAL ENCOUNTER (EMERGENCY)
Facility: HOSPITAL | Age: 30
Discharge: HOME | End: 2025-01-12
Attending: STUDENT IN AN ORGANIZED HEALTH CARE EDUCATION/TRAINING PROGRAM
Payer: COMMERCIAL

## 2025-01-12 VITALS
SYSTOLIC BLOOD PRESSURE: 126 MMHG | DIASTOLIC BLOOD PRESSURE: 90 MMHG | BODY MASS INDEX: 38.71 KG/M2 | HEIGHT: 61 IN | WEIGHT: 205 LBS | RESPIRATION RATE: 16 BRPM | TEMPERATURE: 98.4 F | HEART RATE: 98 BPM | OXYGEN SATURATION: 98 %

## 2025-01-12 DIAGNOSIS — H04.122 DRY EYE OF LEFT SIDE: Primary | ICD-10-CM

## 2025-01-12 PROCEDURE — 99282 EMERGENCY DEPT VISIT SF MDM: CPT | Performed by: STUDENT IN AN ORGANIZED HEALTH CARE EDUCATION/TRAINING PROGRAM

## 2025-01-12 RX ORDER — CETIRIZINE HYDROCHLORIDE 5 MG/1
5 TABLET ORAL DAILY
Qty: 30 TABLET | Refills: 0 | Status: SHIPPED | OUTPATIENT
Start: 2025-01-12 | End: 2025-02-11

## 2025-01-12 ASSESSMENT — PAIN SCALES - GENERAL: PAINLEVEL_OUTOF10: 6

## 2025-01-12 ASSESSMENT — PAIN DESCRIPTION - ORIENTATION: ORIENTATION: LEFT

## 2025-01-12 ASSESSMENT — PAIN - FUNCTIONAL ASSESSMENT: PAIN_FUNCTIONAL_ASSESSMENT: 0-10

## 2025-01-12 ASSESSMENT — COLUMBIA-SUICIDE SEVERITY RATING SCALE - C-SSRS
1. IN THE PAST MONTH, HAVE YOU WISHED YOU WERE DEAD OR WISHED YOU COULD GO TO SLEEP AND NOT WAKE UP?: NO
2. HAVE YOU ACTUALLY HAD ANY THOUGHTS OF KILLING YOURSELF?: NO
6. HAVE YOU EVER DONE ANYTHING, STARTED TO DO ANYTHING, OR PREPARED TO DO ANYTHING TO END YOUR LIFE?: NO

## 2025-01-12 ASSESSMENT — PAIN DESCRIPTION - PAIN TYPE: TYPE: ACUTE PAIN

## 2025-01-12 ASSESSMENT — PAIN DESCRIPTION - LOCATION: LOCATION: EYE

## 2025-01-12 NOTE — ED TRIAGE NOTES
Patient states she has been having ongoing trouble with her eyes especially the left eye. It is red and swollen and sometimes discharge

## 2025-01-18 NOTE — ED PROVIDER NOTES
Chief Complaint: Eye redness  HPI: This is a 29-year-old female, presenting to the emergency department for bilateral eye redness for the last several months, worse in the left eye today.  She is concerned that she may have pinkeye.  She states she has been using Visine over-the-counter, however this seems to make the redness worse.  She denies any recent injury or trauma to her eyes, denies any change in vision, photophobia, or pain with extraocular movements.    Past Medical History:   Diagnosis Date    Depression     Disorder of the skin and subcutaneous tissue, unspecified 05/14/2014    Skin lesion    Encounter for contraceptive management, unspecified 04/17/2015    Contraception management    Encounter for immunization 02/13/2014    Need for HPV vaccine    GERD (gastroesophageal reflux disease)     Hx of ovarian cyst     Migraine     Obesity in pregnancy ([facility])     Personal history of other diseases of the digestive system 05/14/2014    History of constipation    Personal history of other diseases of the female genital tract 03/26/2014    History of vaginitis    Personal history of other diseases of the musculoskeletal system and connective tissue 03/24/2015    History of backache    Personal history of other specified conditions 11/18/2015    History of abdominal pain    Personal history of other specified conditions 01/14/2014    History of urinary frequency      Past Surgical History:   Procedure Laterality Date    TUBAL LIGATION  2024       Physical Exam  Vitals and nursing note reviewed.   Constitutional:       Appearance: Normal appearance.   HENT:      Head: Normocephalic and atraumatic.      Mouth/Throat:      Mouth: Mucous membranes are moist.   Eyes:      General:         Right eye: No discharge.         Left eye: No discharge.      Extraocular Movements: Extraocular movements intact.      Conjunctiva/sclera: Conjunctivae normal.      Pupils: Pupils are equal, round, and reactive to light.       Comments: Redness of the conjunctiva bilaterally, without discharge, worse in the left eye   Cardiovascular:      Rate and Rhythm: Normal rate.   Pulmonary:      Effort: Pulmonary effort is normal.   Abdominal:      General: Abdomen is flat.      Palpations: Abdomen is soft.   Musculoskeletal:         General: Normal range of motion.      Cervical back: Normal range of motion.   Skin:     General: Skin is warm and dry.   Neurological:      General: No focal deficit present.      Mental Status: She is alert.   Psychiatric:         Mood and Affect: Mood normal.            ED Course/[facility]  Diagnoses as of 01/18/25 0937   Dry eye of left side       This is a 29 y.o. female presenting to the ED for evaluation of erythema to the eyes bilaterally which has been intermittently present for the last several months, worse in the left eye today.  She denies any photophobia, change in vision, or extraocular movement pain.  On physical exam, patient is resting comfortably in the bed, no acute distress.  Pupils are equal, round, reactive to light, extraocular movements are intact.  Her eyes do appear red, however there is no discharge, no surrounding periorbital erythema.  I do feel that her symptoms are consistent with dry eyes, as such she was prescribed artificial tears, and advised to follow-up with her eye doctor as an outpatient.  She was advised to return to the emergency department for any new or worsening symptoms and was ultimately discharged home in stable condition.    Final Impression  1.  Dry eyes  Disposition/Plan: Discharge home  Condition at disposition: Stable.     Jacki Chaudhary DO  Emergency Medicine Physician     Jacki Chaudhary DO  01/18/25 0939

## 2025-04-16 DIAGNOSIS — F41.9 ANXIETY: ICD-10-CM

## 2025-04-17 RX ORDER — SERTRALINE HYDROCHLORIDE 100 MG/1
75 TABLET, FILM COATED ORAL DAILY
Qty: 135 TABLET | Refills: 0 | OUTPATIENT
Start: 2025-04-17

## 2025-04-22 ENCOUNTER — OFFICE VISIT (OUTPATIENT)
Dept: PRIMARY CARE | Facility: CLINIC | Age: 30
End: 2025-04-22
Payer: COMMERCIAL

## 2025-04-22 VITALS
BODY MASS INDEX: 45.73 KG/M2 | RESPIRATION RATE: 18 BRPM | SYSTOLIC BLOOD PRESSURE: 120 MMHG | OXYGEN SATURATION: 99 % | HEIGHT: 61 IN | HEART RATE: 103 BPM | DIASTOLIC BLOOD PRESSURE: 76 MMHG | WEIGHT: 242.2 LBS

## 2025-04-22 DIAGNOSIS — F41.9 ANXIETY: Primary | ICD-10-CM

## 2025-04-22 PROCEDURE — 1036F TOBACCO NON-USER: CPT | Performed by: FAMILY MEDICINE

## 2025-04-22 PROCEDURE — 99213 OFFICE O/P EST LOW 20 MIN: CPT | Performed by: FAMILY MEDICINE

## 2025-04-22 PROCEDURE — 3008F BODY MASS INDEX DOCD: CPT | Performed by: FAMILY MEDICINE

## 2025-04-22 RX ORDER — SERTRALINE HYDROCHLORIDE 100 MG/1
150 TABLET, FILM COATED ORAL DAILY
Qty: 135 TABLET | Refills: 3 | Status: SHIPPED | OUTPATIENT
Start: 2025-04-22 | End: 2026-04-22

## 2025-04-22 RX ORDER — BUPROPION HYDROCHLORIDE 150 MG/1
150 TABLET ORAL EVERY MORNING
Qty: 90 TABLET | Refills: 3 | Status: SHIPPED | OUTPATIENT
Start: 2025-04-22 | End: 2026-04-17

## 2025-04-22 ASSESSMENT — ENCOUNTER SYMPTOMS
DEPRESSION: 0
OCCASIONAL FEELINGS OF UNSTEADINESS: 0
LOSS OF SENSATION IN FEET: 0

## 2025-04-22 ASSESSMENT — PATIENT HEALTH QUESTIONNAIRE - PHQ9
2. FEELING DOWN, DEPRESSED OR HOPELESS: NOT AT ALL
1. LITTLE INTEREST OR PLEASURE IN DOING THINGS: NOT AT ALL
SUM OF ALL RESPONSES TO PHQ9 QUESTIONS 1 AND 2: 0

## 2025-04-22 ASSESSMENT — PAIN SCALES - GENERAL: PAINLEVEL_OUTOF10: 0-NO PAIN

## 2025-04-22 NOTE — PROGRESS NOTES
"Subjective   Patient ID: Veena Barone is a 29 y.o. female who presents for Med Refill.    Med Refill     moods all over the place .  Meds dont seem to be working well anymore    Review of Systems    Objective   /76   Pulse 103   Resp 18   Ht 1.549 m (5' 1\")   Wt 110 kg (242 lb 3.2 oz)   SpO2 99%   Breastfeeding Unknown   BMI 45.76 kg/m²     Physical Exam  Constitutional:       General: She is not in acute distress.     Appearance: Normal appearance.   Cardiovascular:      Rate and Rhythm: Normal rate and regular rhythm.      Heart sounds: No murmur heard.  Pulmonary:      Breath sounds: Normal breath sounds. No wheezing.   Neurological:      Mental Status: She is alert.         Assessment/Plan   Problem List Items Addressed This Visit           ICD-10-CM    Anxiety - Primary F41.9    Relevant Medications    sertraline (Zoloft) 100 mg tablet    buPROPion XL (Wellbutrin XL) 150 mg 24 hr tablet          "

## 2025-05-07 ENCOUNTER — OFFICE VISIT (OUTPATIENT)
Dept: URGENT CARE | Facility: URGENT CARE | Age: 30
End: 2025-05-07
Payer: COMMERCIAL

## 2025-05-07 VITALS
OXYGEN SATURATION: 97 % | DIASTOLIC BLOOD PRESSURE: 79 MMHG | WEIGHT: 239.42 LBS | RESPIRATION RATE: 20 BRPM | TEMPERATURE: 98.5 F | BODY MASS INDEX: 45.24 KG/M2 | SYSTOLIC BLOOD PRESSURE: 112 MMHG | HEART RATE: 99 BPM

## 2025-05-07 DIAGNOSIS — H65.03 BILATERAL ACUTE SEROUS OTITIS MEDIA, RECURRENCE NOT SPECIFIED: ICD-10-CM

## 2025-05-07 DIAGNOSIS — J02.9 ACUTE SORE THROAT: Primary | ICD-10-CM

## 2025-05-07 DIAGNOSIS — B34.8 RHINOVIRUS INFECTION: ICD-10-CM

## 2025-05-07 DIAGNOSIS — H92.03 EAR PAIN, BILATERAL: ICD-10-CM

## 2025-05-07 PROBLEM — R55 SYNCOPE: Status: RESOLVED | Noted: 2023-09-17 | Resolved: 2025-05-07

## 2025-05-07 PROBLEM — R09.1 PLEURISY: Status: RESOLVED | Noted: 2023-09-17 | Resolved: 2025-05-07

## 2025-05-07 PROBLEM — B37.31 YEAST VAGINITIS: Status: RESOLVED | Noted: 2023-09-17 | Resolved: 2025-05-07

## 2025-05-07 PROBLEM — R39.89 SENSATION OF PRESSURE IN BLADDER AREA: Status: RESOLVED | Noted: 2023-09-17 | Resolved: 2025-05-07

## 2025-05-07 PROBLEM — E66.9 OBESITY WITH BODY MASS INDEX 30 OR GREATER: Status: ACTIVE | Noted: 2025-05-07

## 2025-05-07 PROBLEM — S83.90XA SPRAIN OF KNEE AND LEG: Status: RESOLVED | Noted: 2023-09-17 | Resolved: 2025-05-07

## 2025-05-07 PROBLEM — M54.9 BACKACHE: Status: RESOLVED | Noted: 2023-09-17 | Resolved: 2025-05-07

## 2025-05-07 PROBLEM — Z30.2 STERILIZATION: Status: RESOLVED | Noted: 2024-02-13 | Resolved: 2025-05-07

## 2025-05-07 PROBLEM — R10.9 ABDOMINAL DISCOMFORT: Status: RESOLVED | Noted: 2023-09-17 | Resolved: 2025-05-07

## 2025-05-07 PROBLEM — N94.0 MITTELSCHMERZ: Status: ACTIVE | Noted: 2025-05-07

## 2025-05-07 PROBLEM — R10.9 FLANK PAIN: Status: RESOLVED | Noted: 2023-09-17 | Resolved: 2025-05-07

## 2025-05-07 PROBLEM — N91.0 DELAYED MENSES: Status: RESOLVED | Noted: 2023-09-17 | Resolved: 2025-05-07

## 2025-05-07 PROBLEM — M25.519 SHOULDER PAIN: Status: RESOLVED | Noted: 2023-09-17 | Resolved: 2025-05-07

## 2025-05-07 PROBLEM — R10.9 ABDOMINAL PAIN: Status: RESOLVED | Noted: 2023-09-17 | Resolved: 2025-05-07

## 2025-05-07 PROBLEM — R11.2 NAUSEA & VOMITING: Status: RESOLVED | Noted: 2023-09-17 | Resolved: 2025-05-07

## 2025-05-07 PROBLEM — F07.81 POSTCONCUSSION SYNDROME: Status: RESOLVED | Noted: 2023-09-17 | Resolved: 2025-05-07

## 2025-05-07 PROBLEM — N12 PYELONEPHRITIS: Status: RESOLVED | Noted: 2023-09-17 | Resolved: 2025-05-07

## 2025-05-07 LAB
POC HUMAN RHINOVIRUS PCR: POSITIVE
POC INFLUENZA A VIRUS PCR: NEGATIVE
POC INFLUENZA B VIRUS PCR: NEGATIVE
POC RESPIRATORY SYNCYTIAL VIRUS PCR: NEGATIVE
POC STREPTOCOCCUS PYOGENES (GROUP A STREP) PCR: NEGATIVE

## 2025-05-07 RX ORDER — AMOXICILLIN 875 MG/1
875 TABLET, FILM COATED ORAL 2 TIMES DAILY
Qty: 20 TABLET | Refills: 0 | Status: SHIPPED | OUTPATIENT
Start: 2025-05-07 | End: 2025-05-17

## 2025-05-07 RX ORDER — DEXTRAN 70, HYPROMELLOSE 2910 3; 1 MG/ML; MG/ML
SOLUTION/ DROPS OPHTHALMIC
COMMUNITY
Start: 2025-01-14

## 2025-05-07 ASSESSMENT — ENCOUNTER SYMPTOMS
DEPRESSION: 0
LOSS OF SENSATION IN FEET: 0
OCCASIONAL FEELINGS OF UNSTEADINESS: 0

## 2025-05-07 ASSESSMENT — PAIN SCALES - GENERAL: PAINLEVEL_OUTOF10: 7

## 2025-05-08 ASSESSMENT — ENCOUNTER SYMPTOMS
CONSTIPATION: 0
DIARRHEA: 0
COUGH: 0
FREQUENCY: 0
HEADACHES: 0
SINUS PRESSURE: 0
ABDOMINAL PAIN: 0
SORE THROAT: 1
VOMITING: 0
FEVER: 0
NAUSEA: 0
DYSURIA: 0
WHEEZING: 0
SHORTNESS OF BREATH: 0
CHEST TIGHTNESS: 0
PALPITATIONS: 0
RHINORRHEA: 1
CHILLS: 0

## 2025-05-08 NOTE — PROGRESS NOTES
Subjective   Patient ID: Veena Barone is a 29 y.o. female.    HPI: 29-year-old female presents with with daughter who also has respiratory illness complaining of sore throat, bilateral ear pain which started today.  She rates her pain a 7 out of 10.      History provided by:  Patient   used: No        The following portions of the chart were reviewed this encounter and updated as appropriate:  Tobacco  Allergies  Meds  Problems  Med Hx  Surg Hx  Fam Hx         Review of Systems   Constitutional:  Negative for chills and fever.   HENT:  Positive for congestion, ear pain, rhinorrhea and sore throat. Negative for sinus pressure.    Respiratory:  Negative for cough, chest tightness, shortness of breath and wheezing.    Cardiovascular:  Negative for palpitations.   Gastrointestinal:  Negative for abdominal pain, constipation, diarrhea, nausea and vomiting.   Genitourinary:  Negative for dysuria and frequency.   Neurological:  Negative for headaches.     Objective   Physical Exam  Vital signs are reviewed. Alert and oriented x3 with normal mood and affect  Patient is well nourished, well-developed, alert and in no acute distress  No pain to palpation over frontal, ethmoid or maxillary sinus areas    External eyes, orbits, conjunctiva and eyelids are normal in appearance  Pupils are equal, round, reactive to light and accommodation, extraocular movements intact    External ears appear normal  External canals are normal in appearance  Right tympanic membrane is intact and dull pink in appearance  Left tympanic membrane is intact and dull pink in appearance  There is no middle ear effusion noted on the right  There is no middle ear effusion noted on the left  External appearance of the nose is normal  Nasal mucosa, septum, turbinates are mildly reddened and slightly swollen in appearance  There is thin yellow nasal discharge in both nares    Oral mucosa is uniformly pink and moist  Palate is  pink, symmetric and intact  Tongue is moist, mobile and midline  Posterior pharynx mildly erythematous with no concretions or exudates present  No cervical lymphadenopathy palpated    Heart has regular rate and rhythm. No murmurs, rubs or gallops are auscultated at this exam.    Respiratory rate rhythm and effort are normal. Breath sounds bilaterally are clear on auscultation without crackles, rhonchi, wheezes or friction rub.    Abdomen: Normal bowel sounds on auscultation. Soft, nontender without rebound or rigidity on palpation  Procedures    Assessment/Plan   Diagnoses and all orders for this visit:  Acute sore throat  -     POCT SPOTFIRE R/ST Panel Mini w/Strep A ([facility]) manually resulted  Ear pain, bilateral  -     POCT SPOTFIRE R/ST Panel Mini w/Strep A ([facility]) manually resulted  -     amoxicillin (Amoxil) 875 mg tablet; Take 1 tablet (875 mg) by mouth 2 times a day for 10 days.  Rhinovirus infection  Bilateral acute serous otitis media, recurrence not specified  -     amoxicillin (Amoxil) 875 mg tablet; Take 1 tablet (875 mg) by mouth 2 times a day for 10 days.    Patient disposition: Home

## 2025-05-24 ENCOUNTER — APPOINTMENT (OUTPATIENT)
Dept: URGENT CARE | Age: 30
End: 2025-05-24
Payer: COMMERCIAL

## (undated) DEVICE — TROCAR, KII OPTICAL SEPARATOR, 8MM X 100MM,  Z-THREAD

## (undated) DEVICE — CATHETER TRAY, FOLEY, ALOETOUCH, 16FR, 10ML, W/ DRAINBAG

## (undated) DEVICE — TUBE SET, PNEUMOCLEAR, SMOKE EVACU, HIGH-FLOW

## (undated) DEVICE — INSUFFLATION TUBING, HIGH FLOW, W/FILTER

## (undated) DEVICE — NEEDLE, INSUFFLATION, 13GAX120MM, DISP

## (undated) DEVICE — Device

## (undated) DEVICE — SLEEVE, KII, Z-THREAD, 5X100CM

## (undated) DEVICE — GOWN, SURGICAL, SIRUS, NON REINFORCED, LARGE

## (undated) DEVICE — SOLUTION, COMPOUND, BENZOIN, TINCTURE, AMPULES, 0.1 CC

## (undated) DEVICE — STRIP, SKIN CLOSURE, STERI STRIP, REINFORCED, 0.5 X 4 IN

## (undated) DEVICE — SUTURE, VICRYL, 0, 27 IN, UR-6, VIOLET

## (undated) DEVICE — FORCEP PKS, CUTTING, LAP, 5MM/33CM 9-PIN

## (undated) DEVICE — GLOVE, SURGICAL, PROTEXIS PI , 6.5, PF, LF

## (undated) DEVICE — SUTURE, MONOCRYL, 4-0, 27 IN, PS-2, UNDYED

## (undated) DEVICE — TROCAR, KII OPTICAL BLADELESS 5MM Z THREAD 100MM LNGTH